# Patient Record
Sex: FEMALE | Race: WHITE | Employment: FULL TIME | URBAN - METROPOLITAN AREA
[De-identification: names, ages, dates, MRNs, and addresses within clinical notes are randomized per-mention and may not be internally consistent; named-entity substitution may affect disease eponyms.]

---

## 2017-01-04 ENCOUNTER — OFFICE VISIT (OUTPATIENT)
Dept: SURGERY | Age: 37
End: 2017-01-04

## 2017-01-04 VITALS — SYSTOLIC BLOOD PRESSURE: 124 MMHG | DIASTOLIC BLOOD PRESSURE: 84 MMHG

## 2017-01-04 DIAGNOSIS — L98.0 PYOGENIC GRANULOMA: Primary | ICD-10-CM

## 2017-01-04 PROCEDURE — 99212 OFFICE O/P EST SF 10 MIN: CPT | Performed by: SURGERY

## 2018-02-01 ENCOUNTER — HOSPITAL ENCOUNTER (OUTPATIENT)
Dept: ULTRASOUND IMAGING | Age: 38
Discharge: HOME | End: 2018-02-01
Attending: INTERNAL MEDICINE | Admitting: INTERNAL MEDICINE

## 2018-02-01 DIAGNOSIS — N63.0 BREAST MASS: ICD-10-CM

## 2018-02-01 DIAGNOSIS — N63.10 BREAST MASS, RIGHT: ICD-10-CM

## 2018-02-01 DIAGNOSIS — R10.11 RIGHT UPPER QUADRANT PAIN: ICD-10-CM

## 2018-02-01 DIAGNOSIS — R10.11 ABDOMINAL PAIN, RIGHT UPPER QUADRANT: ICD-10-CM

## 2018-02-12 ENCOUNTER — HOSPITAL ENCOUNTER (OUTPATIENT)
Dept: NUCLEAR MEDICINE | Age: 38
Discharge: HOME | End: 2018-02-12
Attending: INTERNAL MEDICINE | Admitting: INTERNAL MEDICINE

## 2018-02-12 DIAGNOSIS — R10.11 ABDOMINAL PAIN, RIGHT UPPER QUADRANT: ICD-10-CM

## 2018-02-12 RX ADMIN — Medication 6 MILLICURIE: at 11:37

## 2019-01-28 LAB
HCT VFR BLD CALC: 37.3 % (ref 36–48)
HEMOGLOBIN: 12.6 G/DL (ref 12–16)
MCH RBC QN AUTO: 27.4 PG (ref 26–34)
MCHC RBC AUTO-ENTMCNC: 33.7 G/DL (ref 31–36)
MCV RBC AUTO: 81.4 FL (ref 80–100)
PDW BLD-RTO: 13.4 % (ref 12.4–15.4)
PLATELET # BLD: 337 K/UL (ref 135–450)
PMV BLD AUTO: 7.8 FL (ref 5–10.5)
RBC # BLD: 4.59 M/UL (ref 4–5.2)
TSH SERPL DL<=0.05 MIU/L-ACNC: 2.95 UIU/ML (ref 0.27–4.2)
WBC # BLD: 7.4 K/UL (ref 4–11)

## 2019-01-30 LAB
HPV COMMENT: NORMAL
HPV TYPE 16: NOT DETECTED
HPV TYPE 18: NOT DETECTED
HPVOH (OTHER TYPES): NOT DETECTED

## 2020-09-21 ENCOUNTER — OFFICE VISIT (OUTPATIENT)
Dept: FAMILY MEDICINE CLINIC | Age: 40
End: 2020-09-21
Payer: COMMERCIAL

## 2020-09-21 VITALS
BODY MASS INDEX: 42.19 KG/M2 | TEMPERATURE: 96.8 F | DIASTOLIC BLOOD PRESSURE: 84 MMHG | SYSTOLIC BLOOD PRESSURE: 128 MMHG | OXYGEN SATURATION: 98 % | HEIGHT: 65 IN | WEIGHT: 253.2 LBS | HEART RATE: 88 BPM

## 2020-09-21 PROCEDURE — 99386 PREV VISIT NEW AGE 40-64: CPT | Performed by: NURSE PRACTITIONER

## 2020-09-21 ASSESSMENT — PATIENT HEALTH QUESTIONNAIRE - PHQ9
1. LITTLE INTEREST OR PLEASURE IN DOING THINGS: 0
SUM OF ALL RESPONSES TO PHQ QUESTIONS 1-9: 0
SUM OF ALL RESPONSES TO PHQ9 QUESTIONS 1 & 2: 0
2. FEELING DOWN, DEPRESSED OR HOPELESS: 0
SUM OF ALL RESPONSES TO PHQ QUESTIONS 1-9: 0

## 2020-09-21 ASSESSMENT — ENCOUNTER SYMPTOMS: BACK PAIN: 1

## 2020-10-01 ENCOUNTER — TELEPHONE (OUTPATIENT)
Dept: FAMILY MEDICINE CLINIC | Age: 40
End: 2020-10-01

## 2020-10-01 NOTE — TELEPHONE ENCOUNTER
Patient called wanting to speak with you about having anxiety and depression.   She wants to know if she needs to schedule a visit or would you be able to call in a prescription for her    Please give patient a call

## 2020-10-06 ENCOUNTER — HOSPITAL ENCOUNTER (OUTPATIENT)
Age: 40
Discharge: HOME | End: 2020-10-06
Payer: COMMERCIAL

## 2020-10-06 LAB
A/G RATIO: 1.9 (ref 1.1–2.2)
ALBUMIN SERPL-MCNC: 4.5 G/DL (ref 3.4–5)
ALP BLD-CCNC: 63 U/L (ref 40–129)
ALT SERPL-CCNC: 16 U/L (ref 10–40)
ANION GAP SERPL CALCULATED.3IONS-SCNC: 13 MMOL/L (ref 3–16)
AST SERPL-CCNC: 16 U/L (ref 15–37)
BASOPHILS ABSOLUTE: 0.1 K/UL (ref 0–0.2)
BASOPHILS RELATIVE PERCENT: 1.4 %
BILIRUB SERPL-MCNC: 0.9 MG/DL (ref 0–1)
BUN BLDV-MCNC: 10 MG/DL (ref 7–20)
CALCIUM SERPL-MCNC: 9.4 MG/DL (ref 8.3–10.6)
CHLORIDE BLD-SCNC: 99 MMOL/L (ref 99–110)
CHOLESTEROL, FASTING: 170 MG/DL (ref 0–199)
CO2: 25 MMOL/L (ref 21–32)
CREAT SERPL-MCNC: 0.8 MG/DL (ref 0.6–1.1)
EOSINOPHILS ABSOLUTE: 0.1 K/UL (ref 0–0.6)
EOSINOPHILS RELATIVE PERCENT: 1.9 %
GFR AFRICAN AMERICAN: >60
GFR NON-AFRICAN AMERICAN: >60
GLOBULIN: 2.4 G/DL
GLUCOSE FASTING: 87 MG/DL (ref 70–99)
HCT VFR BLD CALC: 37.9 % (ref 36–48)
HDLC SERPL-MCNC: 52 MG/DL (ref 40–60)
HEMOGLOBIN: 13.1 G/DL (ref 12–16)
LDL CHOLESTEROL CALCULATED: 90 MG/DL
LYMPHOCYTES ABSOLUTE: 2 K/UL (ref 1–5.1)
LYMPHOCYTES RELATIVE PERCENT: 27.7 %
MCH RBC QN AUTO: 28.8 PG (ref 26–34)
MCHC RBC AUTO-ENTMCNC: 34.5 G/DL (ref 31–36)
MCV RBC AUTO: 83.5 FL (ref 80–100)
MONOCYTES ABSOLUTE: 0.4 K/UL (ref 0–1.3)
MONOCYTES RELATIVE PERCENT: 6.1 %
NEUTROPHILS ABSOLUTE: 4.4 K/UL (ref 1.7–7.7)
NEUTROPHILS RELATIVE PERCENT: 62.9 %
PDW BLD-RTO: 13.4 % (ref 12.4–15.4)
PLATELET # BLD: 326 K/UL (ref 135–450)
PMV BLD AUTO: 7.6 FL (ref 5–10.5)
POTASSIUM SERPL-SCNC: 4.1 MMOL/L (ref 3.5–5.1)
RBC # BLD: 4.54 M/UL (ref 4–5.2)
SODIUM BLD-SCNC: 137 MMOL/L (ref 136–145)
TOTAL PROTEIN: 6.9 G/DL (ref 6.4–8.2)
TRIGLYCERIDE, FASTING: 141 MG/DL (ref 0–150)
TSH REFLEX: 1.66 UIU/ML (ref 0.27–4.2)
VLDLC SERPL CALC-MCNC: 28 MG/DL
WBC # BLD: 7.1 K/UL (ref 4–11)

## 2020-10-06 PROCEDURE — 36415 COLL VENOUS BLD VENIPUNCTURE: CPT

## 2020-10-06 PROCEDURE — 85025 COMPLETE CBC W/AUTO DIFF WBC: CPT

## 2020-10-06 PROCEDURE — 80061 LIPID PANEL: CPT

## 2020-10-06 PROCEDURE — 84443 ASSAY THYROID STIM HORMONE: CPT

## 2020-10-06 PROCEDURE — 80053 COMPREHEN METABOLIC PANEL: CPT

## 2020-10-08 ENCOUNTER — OFFICE VISIT (OUTPATIENT)
Dept: FAMILY MEDICINE CLINIC | Age: 40
End: 2020-10-08
Payer: COMMERCIAL

## 2020-10-08 VITALS
HEART RATE: 94 BPM | BODY MASS INDEX: 42.01 KG/M2 | TEMPERATURE: 96.6 F | WEIGHT: 254.4 LBS | SYSTOLIC BLOOD PRESSURE: 126 MMHG | DIASTOLIC BLOOD PRESSURE: 84 MMHG | OXYGEN SATURATION: 98 %

## 2020-10-08 PROCEDURE — 99213 OFFICE O/P EST LOW 20 MIN: CPT | Performed by: NURSE PRACTITIONER

## 2020-10-08 RX ORDER — BUPROPION HYDROCHLORIDE 300 MG/1
300 TABLET ORAL EVERY MORNING
Qty: 90 TABLET | Refills: 1 | Status: SHIPPED | OUTPATIENT
Start: 2020-10-08 | End: 2021-04-01

## 2020-10-08 ASSESSMENT — PATIENT HEALTH QUESTIONNAIRE - PHQ9
SUM OF ALL RESPONSES TO PHQ9 QUESTIONS 1 & 2: 2
SUM OF ALL RESPONSES TO PHQ QUESTIONS 1-9: 2
2. FEELING DOWN, DEPRESSED OR HOPELESS: 2
1. LITTLE INTEREST OR PLEASURE IN DOING THINGS: 0
SUM OF ALL RESPONSES TO PHQ QUESTIONS 1-9: 2

## 2020-10-08 ASSESSMENT — ENCOUNTER SYMPTOMS
CHEST TIGHTNESS: 1
NAUSEA: 0
CONSTIPATION: 0
SHORTNESS OF BREATH: 0
DIARRHEA: 0

## 2020-10-08 NOTE — PROGRESS NOTES
Joao Gilbert  : 1980  Encounter date: 10/8/2020    This adri 38 y.o. female who presents with  Chief Complaint   Patient presents with    Anxiety     X1 Month.  Depression     X1 month. Used to take Zoloft- has been off for about 3 years       History of present illness:    HPI Pt is 38year old female with worsening anxiety and depression. Reports history, started first time with birth of son 15 years ago, has been on and off zoloft intermittently. Pt reports recent worsened depression due to family health concerns, financial concerns. Pt tearful, reports good support system but feelings of overwhelmed. Pt not able to find time for self. Pt denies suicidal ideation or self harm. No current outpatient medications on file prior to visit. No current facility-administered medications on file prior to visit. No Known Allergies  Past Medical History:   Diagnosis Date    Back pain     Hypertension     mild      Past Surgical History:   Procedure Laterality Date    BACK SURGERY      KNEE SURGERY          WISDOM TOOTH EXTRACTION            Family History   Problem Relation Age of Onset    High Blood Pressure Mother     Atrial Fibrillation Father     COPD Father       Social History     Tobacco Use    Smoking status: Former Smoker    Smokeless tobacco: Never Used   Substance Use Topics    Alcohol use: Yes     Comment: rarely        Review of Systems   Constitutional: Positive for fatigue. Negative for activity change and appetite change. Respiratory: Positive for chest tightness. Negative for shortness of breath. Cardiovascular: Negative for chest pain. Gastrointestinal: Negative for constipation, diarrhea and nausea. Neurological: Negative for headaches. Psychiatric/Behavioral: Positive for dysphoric mood and sleep disturbance. Negative for decreased concentration, self-injury and suicidal ideas. The patient is nervous/anxious.         Objective:    /84 (Site: Left Upper Arm, Position: Sitting, Cuff Size: Large Adult)   Pulse 94   Temp 96.6 °F (35.9 °C)   Wt 254 lb 6.4 oz (115.4 kg)   SpO2 98%   BMI 42.01 kg/m²   Weight: 254 lb 6.4 oz (115.4 kg)     BP Readings from Last 3 Encounters:   10/08/20 126/84   09/21/20 128/84   01/04/17 124/84     Wt Readings from Last 3 Encounters:   10/08/20 254 lb 6.4 oz (115.4 kg)   09/21/20 253 lb 3.2 oz (114.9 kg)   12/21/16 256 lb (116.1 kg)     BMI Readings from Last 3 Encounters:   10/08/20 42.01 kg/m²   09/21/20 41.81 kg/m²   12/21/16 42.60 kg/m²       Physical Exam  Vitals signs reviewed. Constitutional:       Appearance: Normal appearance. She is well-developed. Cardiovascular:      Rate and Rhythm: Normal rate and regular rhythm. Heart sounds: Normal heart sounds. No murmur. Pulmonary:      Effort: Pulmonary effort is normal.      Breath sounds: Normal breath sounds. Skin:     General: Skin is warm and dry. Neurological:      Mental Status: She is alert and oriented to person, place, and time. Psychiatric:      Comments: Tearful when discussing concerns         Assessment/Plan    1. Moderate episode of recurrent major depressive disorder ([facility] [address]  Advised continued open communication  Practice self care  - buPROPion (WELLBUTRIN XL) 300 MG extended release tablet; Take 1 tablet by mouth every morning  Dispense: 90 tablet; Refill: 1      Return if symptoms worsen or fail to improve, for medication re-check. This dictation was generated by voice recognition computer software. Although all attempts are made to edit the dictation for accuracy, there may be errors in the transcription that are not intended.

## 2020-12-28 ENCOUNTER — OFFICE VISIT (OUTPATIENT)
Dept: PRIMARY CARE CLINIC | Age: 40
End: 2020-12-28
Payer: COMMERCIAL

## 2020-12-28 PROCEDURE — 99211 OFF/OP EST MAY X REQ PHY/QHP: CPT | Performed by: NURSE PRACTITIONER

## 2020-12-28 NOTE — PATIENT INSTRUCTIONS

## 2020-12-29 LAB — SARS-COV-2, NAA: NOT DETECTED

## 2021-02-04 ENCOUNTER — TELEPHONE (OUTPATIENT)
Dept: FAMILY MEDICINE CLINIC | Age: 41
End: 2021-02-04

## 2021-02-04 NOTE — TELEPHONE ENCOUNTER
Pt called and yes she is having symptoms Rt ear is full and her eye is puffy. Pt is coming in for an appt Monday. Per SW she should make an appt here with the red visit side or go to ED.

## 2021-02-08 ENCOUNTER — OFFICE VISIT (OUTPATIENT)
Dept: FAMILY MEDICINE CLINIC | Age: 41
End: 2021-02-08
Payer: COMMERCIAL

## 2021-02-08 VITALS — OXYGEN SATURATION: 99 % | TEMPERATURE: 97.9 F | HEART RATE: 87 BPM

## 2021-02-08 DIAGNOSIS — B96.89 ACUTE BACTERIAL SINUSITIS: Primary | ICD-10-CM

## 2021-02-08 DIAGNOSIS — J01.90 ACUTE BACTERIAL SINUSITIS: Primary | ICD-10-CM

## 2021-02-08 PROCEDURE — 99213 OFFICE O/P EST LOW 20 MIN: CPT | Performed by: FAMILY MEDICINE

## 2021-02-08 RX ORDER — AMOXICILLIN 500 MG/1
1000 CAPSULE ORAL 2 TIMES DAILY
Qty: 28 CAPSULE | Refills: 0 | Status: SHIPPED | OUTPATIENT
Start: 2021-02-08 | End: 2021-02-15

## 2021-02-08 ASSESSMENT — PATIENT HEALTH QUESTIONNAIRE - PHQ9
SUM OF ALL RESPONSES TO PHQ9 QUESTIONS 1 & 2: 0
SUM OF ALL RESPONSES TO PHQ QUESTIONS 1-9: 0
2. FEELING DOWN, DEPRESSED OR HOPELESS: 0
SUM OF ALL RESPONSES TO PHQ QUESTIONS 1-9: 0

## 2021-02-08 NOTE — PROGRESS NOTES
Subjective:      Patient ID: Jhony Carter is a 38 y.o. female. HPI patient presents with persistent nasal congestion which has been going on now since December and being treated with Claritin with some relief. Now she is fine her self having dizziness seems occur with position changes. There is been no vomiting associated with this. She denies any vision changes. There is no reported fevers or chills. Patient just had her second Covid vaccination last week    Review of Systems  No Known Allergies  Vitals:    02/08/21 1300   Pulse: 87   Temp: 97.9 °F (36.6 °C)   SpO2: 99%       Objective:   Physical Exam  Constitutional:       General: She is not in acute distress. Appearance: She is well-developed. HENT:      Right Ear: Tympanic membrane and ear canal normal.      Left Ear: Tympanic membrane and ear canal normal.      Nose: Mucosal edema and rhinorrhea (purulent) present. Right Sinus: No maxillary sinus tenderness or frontal sinus tenderness. Left Sinus: Maxillary sinus tenderness and frontal sinus tenderness present. Mouth/Throat:      Mouth: Mucous membranes are moist.      Pharynx: Oropharynx is clear. Neck:      Musculoskeletal: Neck supple. Vascular: No carotid bruit. Cardiovascular:      Rate and Rhythm: Normal rate and regular rhythm. Heart sounds: No murmur. Pulmonary:      Effort: Pulmonary effort is normal.      Breath sounds: Normal breath sounds. Lymphadenopathy:      Cervical: No cervical adenopathy. Neurological:      Mental Status: She is alert and oriented to person, place, and time. Cranial Nerves: Cranial nerves are intact. Sensory: Sensation is intact. Motor: Motor function is intact. Coordination: Romberg sign negative. Gait: Gait and tandem walk normal.   Psychiatric:         Behavior: Behavior is cooperative. Assessment:      José Manuel Cleary was seen today for headache and congestion.     Diagnoses and all orders for this visit:    Acute bacterial sinusitis    Other orders  -     amoxicillin (AMOXIL) 500 MG capsule; Take 2 capsules by mouth 2 times daily for 7 days            Plan:      Humidification of the bedroom was discussed.   Maintain over-the-counter medication when necessary  RTC when necessary    Medical decision making of low complexity            Douglas Wiggins MD

## 2021-04-01 DIAGNOSIS — F33.1 MODERATE EPISODE OF RECURRENT MAJOR DEPRESSIVE DISORDER (HCC): ICD-10-CM

## 2021-04-01 RX ORDER — BUPROPION HYDROCHLORIDE 300 MG/1
300 TABLET ORAL EVERY MORNING
Qty: 90 TABLET | Refills: 1 | Status: SHIPPED | OUTPATIENT
Start: 2021-04-01 | End: 2021-10-08 | Stop reason: SDUPTHER

## 2021-10-08 DIAGNOSIS — F33.1 MODERATE EPISODE OF RECURRENT MAJOR DEPRESSIVE DISORDER (HCC): ICD-10-CM

## 2021-10-08 RX ORDER — BUPROPION HYDROCHLORIDE 300 MG/1
300 TABLET ORAL EVERY MORNING
Qty: 90 TABLET | Refills: 0 | Status: SHIPPED | OUTPATIENT
Start: 2021-10-08 | End: 2022-01-05

## 2021-10-08 NOTE — TELEPHONE ENCOUNTER
----- Message from Kristin Cabrera sent at 10/8/2021  1:51 PM EDT -----  Subject: Message to Provider    QUESTIONS  Information for Provider? Patient is running out of meds. She only have a   2 day supply and don't Marichuy Wagner go without it. Can she get a prescription   called in for her meds. she asked that someone call back as soon as   possible. jonathan green  ---------------------------------------------------------------------------  --------------  CALL BACK INFO  What is the best way for the office to contact you? OK to leave message on   voicemail  Preferred Call Back Phone Number?  [phone]  ---------------------------------------------------------------------------  --------------  SCRIPT ANSWERS  undefined

## 2021-10-18 ENCOUNTER — OFFICE VISIT (OUTPATIENT)
Dept: FAMILY MEDICINE CLINIC | Age: 41
End: 2021-10-18
Payer: COMMERCIAL

## 2021-10-18 VITALS
OXYGEN SATURATION: 98 % | DIASTOLIC BLOOD PRESSURE: 92 MMHG | TEMPERATURE: 98.7 F | WEIGHT: 249 LBS | SYSTOLIC BLOOD PRESSURE: 128 MMHG | HEIGHT: 66 IN | HEART RATE: 90 BPM | BODY MASS INDEX: 40.02 KG/M2

## 2021-10-18 DIAGNOSIS — Z12.31 ENCOUNTER FOR SCREENING MAMMOGRAM FOR MALIGNANT NEOPLASM OF BREAST: ICD-10-CM

## 2021-10-18 DIAGNOSIS — Z00.00 ENCOUNTER FOR PREVENTATIVE ADULT HEALTH CARE EXAMINATION: Primary | ICD-10-CM

## 2021-10-18 DIAGNOSIS — F41.9 ANXIETY: ICD-10-CM

## 2021-10-18 DIAGNOSIS — Z23 NEED FOR VACCINATION: ICD-10-CM

## 2021-10-18 PROCEDURE — 90471 IMMUNIZATION ADMIN: CPT | Performed by: NURSE PRACTITIONER

## 2021-10-18 PROCEDURE — 99396 PREV VISIT EST AGE 40-64: CPT | Performed by: NURSE PRACTITIONER

## 2021-10-18 PROCEDURE — 90756 CCIIV4 VACC ABX FREE IM: CPT | Performed by: NURSE PRACTITIONER

## 2021-10-18 RX ORDER — LORATADINE 10 MG/1
10 TABLET ORAL DAILY
COMMUNITY

## 2021-10-18 RX ORDER — IBUPROFEN 600 MG/1
600 TABLET ORAL EVERY 6 HOURS PRN
COMMUNITY

## 2021-10-18 RX ORDER — BUSPIRONE HYDROCHLORIDE 5 MG/1
5 TABLET ORAL 2 TIMES DAILY
Qty: 60 TABLET | Refills: 0 | Status: SHIPPED | OUTPATIENT
Start: 2021-10-18 | End: 2022-04-04 | Stop reason: SDUPTHER

## 2021-10-18 NOTE — PROGRESS NOTES
Mario Bryant  : 1980  Encounter date: 10/18/2021    This adri 42 y.o. female who presents with  Chief Complaint   Patient presents with    Annual Exam     yearly check up/med check       History of present illness:    HPI   History and Physical      Mario Bryant  YOB: 1980    Date of Service:  10/18/2021    Chief Complaint:   Mario Bryant is a 42 y.o. female who  presents for physical examination. HPI: Pt is 42year old female for annual exam.  Pt is due for mammogram.  Due for labs. PT reports has lost some weight but feels she has not plateued. Pt is taking wellbutrin for anxiety/depression, reports on some days needs a little extra, feeling like she is \"crawling out of skin\". Wt Readings from Last 3 Encounters:   10/18/21 249 lb (112.9 kg)   10/08/20 254 lb 6.4 oz (115.4 kg)   20 253 lb 3.2 oz (114.9 kg)     BP Readings from Last 3 Encounters:   10/18/21 (!) 128/92   10/08/20 126/84   20 128/84       There is no problem list on file for this patient.       Preventive Care:  Health Maintenance   Topic Date Due    Hepatitis C screen  Never done    HIV screen  Never done    DTaP/Tdap/Td vaccine (1 - Tdap) Never done    Flu vaccine (1) 2021    Diabetes screen  10/06/2023    Cervical cancer screen  2024    Lipid screen  10/06/2025    COVID-19 Vaccine  Completed    Hepatitis A vaccine  Aged Out    Hepatitis B vaccine  Aged Out    Hib vaccine  Aged Out    Meningococcal (ACWY) vaccine  Aged Out    Pneumococcal 0-64 years Vaccine  Aged Out      Hx abnormal PAP: no, ablation  Sexual activity: single partner, contraception - none   Self-breast exams: yes  Previous DEXA scan: no  Last eye exam: , reports going up 2 on vision  Exercise: no regular exercise  Lipid panel:   Lab Results   Component Value Date    CHOL 182 2015    TRIG 121 2015    HDL 52 10/06/2020    [address] 10/06/2020        Living will:No    Immunization History   Administered Date(s) Administered    COVID-19, Moderna, PF, 100mcg/0.5mL 01/08/2021, 02/05/2021       No Known Allergies  Outpatient Medications Marked as Taking for the 10/18/21 encounter (Office Visit) with KELLI Glynn NP   Medication Sig Dispense Refill    loratadine (CLARITIN) 10 MG tablet Take 10 mg by mouth daily      ibuprofen (ADVIL;MOTRIN) 600 MG tablet Take 600 mg by mouth every 6 hours as needed for Pain      busPIRone (BUSPAR) 5 MG tablet Take 1 tablet by mouth 2 times daily 60 tablet 0    buPROPion (WELLBUTRIN XL) 300 MG extended release tablet Take 1 tablet by mouth every morning 90 tablet 0       Past Medical History:   Diagnosis Date    Back pain     Hypertension     mild     Past Surgical History:   Procedure Laterality Date    BACK SURGERY      KNEE SURGERY      1996    WISDOM TOOTH EXTRACTION      1997     Family History   Problem Relation Age of Onset    High Blood Pressure Mother     Atrial Fibrillation Father     COPD Father      Social History     Socioeconomic History    Marital status:      Spouse name: Not on file    Number of children: Not on file    Years of education: Not on file    Highest education level: Not on file   Occupational History    Not on file   Tobacco Use    Smoking status: Former Smoker    Smokeless tobacco: Never Used   Substance and Sexual Activity    Alcohol use: Yes     Comment: rarely    Drug use: No    Sexual activity: Not on file   Other Topics Concern    Not on file   Social History Narrative    Not on file     Social Determinants of Health     Financial Resource Strain:     Difficulty of Paying Living Expenses:    Food Insecurity:     Worried About Running Out of Food in the Last Year:     Ran Out of Food in the Last Year:    Transportation Needs:     Lack of Transportation (Medical):      Lack of Transportation (Non-Medical):    Physical Activity:     Days of Exercise per Week:     Minutes of Exercise per Session:    Stress:     Feeling of Stress :    Social Connections:     Frequency of Communication with Friends and Family:     Frequency of Social Gatherings with Friends and Family:     Attends [employer] Services:     Active Member of Clubs or Organizations:     Attends Club or Organization Meetings:     Marital Status:    Intimate Partner Violence:     Fear of Current or Ex-Partner:     Emotionally Abused:     Physically Abused:     Sexually Abused:        Review of Systems:  A comprehensive review of systems was negative except for what was noted in the HPI. Physical Exam:   Vitals:    10/18/21 1119 10/18/21 1137   BP: (!) 142/110 (!) 128/92   Site: Left Upper Arm    Position: Sitting    Cuff Size: Large Adult    Pulse: 90    Temp: 98.7 °F (37.1 °C)    SpO2: 98%    Weight: 249 lb (112.9 kg)    Height: 5' 5.5\" (1.664 m)      Body mass index is 40.81 kg/m². Constitutional: She is oriented to person, place, and time. She appears well-developed and well-nourished. No distress. HEENT:   Head: Normocephalic and atraumatic. Right Ear: Tympanic membrane, external ear and ear canal normal.   Left Ear: Tympanic membrane, external ear and ear canal normal.   Nose: Nose normal.   Mouth/Throat: Oropharynx is clear and moist, and mucous membranes are normal.  There is no cervical adenopathy. Eyes: Conjunctivae and extraocular motions are normal. Pupils are equal, round, and reactive to light. Neck: Neck supple. No JVD present. Carotid bruit is not present. No mass and no thyromegaly present. Cardiovascular: Normal rate, regular rhythm, normal heart sounds and intact distal pulses. Exam reveals no gallop and no friction rub. No murmur heard. Pulmonary/Chest: Effort normal and breath sounds normal. No respiratory distress. She has no wheezes, rhonchi or rales. Abdominal: Soft, non-tender. Bowel sounds and aorta are normal. She exhibits no organomegaly, mass or bruit.    Breast exam: not examined. Musculoskeletal: Normal range of motion, no synovitis. She exhibits no edema. Neurological: She is alert and oriented to person, place, and time. She has normal reflexes. No cranial nerve deficit. Coordination normal.   Skin: Skin is warm and dry. There is no rash or erythema. No suspicious lesions noted. Psychiatric: She has a normal mood and affect. Her speech is normal and behavior is normal. Judgment, cognition and memory are normal.     Assessment/Plan:    Nafisa Westfall was seen today for annual exam.    Diagnoses and all orders for this visit:    Encounter for preventative adult health care examination  -     CBC Auto Differential; Future  -     Comprehensive Metabolic Panel, Fasting; Future  -     Lipid, Fasting; Future    Encounter for screening mammogram for malignant neoplasm of breast  -     JAY DIGITAL SCREEN W OR WO CAD BILATERAL; Future    Anxiety  -     busPIRone (BUSPAR) 5 MG tablet; Take 1 tablet by mouth 2 times daily    Need for vaccination  -     INFLUENZA, MDCK QUADV, 2 YRS AND OLDER, IM, MDV, 0.5ML (FLUCELVAX QUADV)            Current Outpatient Medications on File Prior to Visit   Medication Sig Dispense Refill    loratadine (CLARITIN) 10 MG tablet Take 10 mg by mouth daily      ibuprofen (ADVIL;MOTRIN) 600 MG tablet Take 600 mg by mouth every 6 hours as needed for Pain      buPROPion (WELLBUTRIN XL) 300 MG extended release tablet Take 1 tablet by mouth every morning 90 tablet 0     No current facility-administered medications on file prior to visit.       No Known Allergies  Past Medical History:   Diagnosis Date    Back pain     Hypertension     mild      Past Surgical History:   Procedure Laterality Date    BACK SURGERY      KNEE SURGERY      1996    WISDOM TOOTH EXTRACTION      1997      Family History   Problem Relation Age of Onset    High Blood Pressure Mother     Atrial Fibrillation Father     COPD Father       Social History     Tobacco Use    Smoking status: Former Smoker    Smokeless tobacco: Never Used   Substance Use Topics    Alcohol use: Yes     Comment: rarely        Review of Systems    Objective:    BP (!) 128/92   Pulse 90   Temp 98.7 °F (37.1 °C)   Ht 5' 5.5\" (1.664 m)   Wt 249 lb (112.9 kg)   SpO2 98%   BMI 40.81 kg/m²   Weight: 249 lb (112.9 kg)     BP Readings from Last 3 Encounters:   10/18/21 (!) 128/92   10/08/20 126/84   09/21/20 128/84     Wt Readings from Last 3 Encounters:   10/18/21 249 lb (112.9 kg)   10/08/20 254 lb 6.4 oz (115.4 kg)   09/21/20 253 lb 3.2 oz (114.9 kg)     BMI Readings from Last 3 Encounters:   10/18/21 40.81 kg/m²   10/08/20 42.01 kg/m²   09/21/20 41.81 kg/m²       Physical Exam    Assessment/Plan    1. Encounter for preventative adult health care examination  Advised routine dental and vision  Advised living will  Increased exercise  - CBC Auto Differential; Future  - Comprehensive Metabolic Panel, Fasting; Future  - Lipid, Fasting; Future    2. Encounter for screening mammogram for malignant neoplasm of breast  - JAY DIGITAL SCREEN W OR WO CAD BILATERAL; Future    3. Anxiety  Uncontrolled  Continue wellbutrin 300 mg  - busPIRone (BUSPAR) 5 MG tablet; Take 1 tablet by mouth 2 times daily  Dispense: 60 tablet; Refill: 0    4. Need for vaccination  Administered  - INFLUENZA, MDCK QUADV, 2 YRS AND OLDER, IM, MDV, 0.5ML (Charolotte Foots)      Return in about 1 year (around 10/18/2022) for annual check up. This dictation was generated by voice recognition computer software. Although all attempts are made to edit the dictation for accuracy, there may be errors in the transcription that are not intended.

## 2021-10-25 ENCOUNTER — HOSPITAL ENCOUNTER (OUTPATIENT)
Age: 41
Discharge: HOME | End: 2021-10-25
Payer: COMMERCIAL

## 2021-10-25 DIAGNOSIS — Z00.00 ENCOUNTER FOR PREVENTATIVE ADULT HEALTH CARE EXAMINATION: ICD-10-CM

## 2021-10-25 LAB
A/G RATIO: 2 (ref 1.1–2.2)
ALBUMIN SERPL-MCNC: 4.8 G/DL (ref 3.4–5)
ALP BLD-CCNC: 57 U/L (ref 40–129)
ALT SERPL-CCNC: 14 U/L (ref 10–40)
ANION GAP SERPL CALCULATED.3IONS-SCNC: 14 MMOL/L (ref 3–16)
AST SERPL-CCNC: 13 U/L (ref 15–37)
BASOPHILS ABSOLUTE: 0.1 K/UL (ref 0–0.2)
BASOPHILS RELATIVE PERCENT: 1 %
BILIRUB SERPL-MCNC: 0.5 MG/DL (ref 0–1)
BUN BLDV-MCNC: 12 MG/DL (ref 7–20)
CALCIUM SERPL-MCNC: 9 MG/DL (ref 8.3–10.6)
CHLORIDE BLD-SCNC: 103 MMOL/L (ref 99–110)
CHOLESTEROL, FASTING: 183 MG/DL (ref 0–199)
CO2: 22 MMOL/L (ref 21–32)
CREAT SERPL-MCNC: 0.9 MG/DL (ref 0.6–1.1)
EOSINOPHILS ABSOLUTE: 0.1 K/UL (ref 0–0.6)
EOSINOPHILS RELATIVE PERCENT: 2.3 %
GFR AFRICAN AMERICAN: >60
GFR NON-AFRICAN AMERICAN: >60
GLOBULIN: 2.4 G/DL
GLUCOSE FASTING: 105 MG/DL (ref 70–99)
HCT VFR BLD CALC: 39.5 % (ref 36–48)
HDLC SERPL-MCNC: 51 MG/DL (ref 40–60)
HEMOGLOBIN: 13.2 G/DL (ref 12–16)
LDL CHOLESTEROL CALCULATED: 116 MG/DL
LYMPHOCYTES ABSOLUTE: 1.3 K/UL (ref 1–5.1)
LYMPHOCYTES RELATIVE PERCENT: 21.4 %
MCH RBC QN AUTO: 28.6 PG (ref 26–34)
MCHC RBC AUTO-ENTMCNC: 33.3 G/DL (ref 31–36)
MCV RBC AUTO: 85.9 FL (ref 80–100)
MONOCYTES ABSOLUTE: 0.4 K/UL (ref 0–1.3)
MONOCYTES RELATIVE PERCENT: 6 %
NEUTROPHILS ABSOLUTE: 4 K/UL (ref 1.7–7.7)
NEUTROPHILS RELATIVE PERCENT: 69.3 %
PDW BLD-RTO: 12.4 % (ref 12.4–15.4)
PLATELET # BLD: 297 K/UL (ref 135–450)
PMV BLD AUTO: 7.4 FL (ref 5–10.5)
POTASSIUM SERPL-SCNC: 4.1 MMOL/L (ref 3.5–5.1)
RBC # BLD: 4.6 M/UL (ref 4–5.2)
SODIUM BLD-SCNC: 139 MMOL/L (ref 136–145)
TOTAL PROTEIN: 7.2 G/DL (ref 6.4–8.2)
TRIGLYCERIDE, FASTING: 78 MG/DL (ref 0–150)
VLDLC SERPL CALC-MCNC: 16 MG/DL
WBC # BLD: 5.8 K/UL (ref 4–11)

## 2021-10-25 PROCEDURE — 80053 COMPREHEN METABOLIC PANEL: CPT

## 2021-10-25 PROCEDURE — 85025 COMPLETE CBC W/AUTO DIFF WBC: CPT

## 2021-10-25 PROCEDURE — 36415 COLL VENOUS BLD VENIPUNCTURE: CPT

## 2021-10-25 PROCEDURE — 80061 LIPID PANEL: CPT

## 2022-01-05 DIAGNOSIS — F33.1 MODERATE EPISODE OF RECURRENT MAJOR DEPRESSIVE DISORDER (HCC): ICD-10-CM

## 2022-01-05 RX ORDER — BUPROPION HYDROCHLORIDE 300 MG/1
300 TABLET ORAL EVERY MORNING
Qty: 90 TABLET | Refills: 0 | Status: SHIPPED | OUTPATIENT
Start: 2022-01-05 | End: 2022-03-31 | Stop reason: SDUPTHER

## 2022-01-05 NOTE — TELEPHONE ENCOUNTER
Medication:   Requested Prescriptions     Pending Prescriptions Disp Refills    buPROPion (WELLBUTRIN XL) 300 MG extended release tablet [Pharmacy Med Name: BUPROPION XL 300MG TABLETS] 90 tablet 0     Sig: TAKE 1 TABLET BY MOUTH EVERY MORNING     Last Filled:  10/8/21    Last appt: 10/18/2021   Next appt: Visit date not found    Last Lipid:   Lab Results   Component Value Date    CHOL 182 03/20/2015    TRIG 121 03/20/2015    HDL 51 10/25/2021    LDLCALC 116 10/25/2021

## 2022-01-13 ENCOUNTER — OFFICE VISIT (OUTPATIENT)
Dept: FAMILY MEDICINE CLINIC | Age: 42
End: 2022-01-13
Payer: COMMERCIAL

## 2022-01-13 VITALS
DIASTOLIC BLOOD PRESSURE: 98 MMHG | BODY MASS INDEX: 39.99 KG/M2 | TEMPERATURE: 98.2 F | SYSTOLIC BLOOD PRESSURE: 130 MMHG | OXYGEN SATURATION: 99 % | HEART RATE: 69 BPM | WEIGHT: 244 LBS

## 2022-01-13 DIAGNOSIS — B37.9 YEAST INFECTION: ICD-10-CM

## 2022-01-13 DIAGNOSIS — L72.3 SEBACEOUS CYST: Primary | ICD-10-CM

## 2022-01-13 PROCEDURE — 99213 OFFICE O/P EST LOW 20 MIN: CPT | Performed by: NURSE PRACTITIONER

## 2022-01-13 RX ORDER — FLUCONAZOLE 150 MG/1
150 TABLET ORAL ONCE
Qty: 1 TABLET | Refills: 0 | Status: SHIPPED | OUTPATIENT
Start: 2022-01-13 | End: 2022-01-13

## 2022-01-13 RX ORDER — DOXYCYCLINE HYCLATE 100 MG
100 TABLET ORAL 2 TIMES DAILY
Qty: 20 TABLET | Refills: 0 | Status: SHIPPED | OUTPATIENT
Start: 2022-01-13 | End: 2022-01-23

## 2022-01-13 NOTE — PROGRESS NOTES
Liana Kirby  : 1980  Encounter date: 2022    This adri 42 y.o. female who presents with  Chief Complaint   Patient presents with    Mass     on the back/big in size/hurts to touch       History of present illness:    HPI Pt is 42year old female with large skin lesion in center of back between shoulder blades, reports history of cysts, has never had to have surgical I&D. Pt reports going to [place] and applying sunscreen, developed small lump, first thought had drained significantly but has since become larger, firm and tender. Denies fevers. Has not tried anything so far. PT reports prone to yeast infections with ABX. Current Outpatient Medications on File Prior to Visit   Medication Sig Dispense Refill    buPROPion (WELLBUTRIN XL) 300 MG extended release tablet TAKE 1 TABLET BY MOUTH EVERY MORNING 90 tablet 0    loratadine (CLARITIN) 10 MG tablet Take 10 mg by mouth daily      ibuprofen (ADVIL;MOTRIN) 600 MG tablet Take 600 mg by mouth every 6 hours as needed for Pain       No current facility-administered medications on file prior to visit.       No Known Allergies  Past Medical History:   Diagnosis Date    Back pain     Hypertension     mild      Past Surgical History:   Procedure Laterality Date    BACK SURGERY      KNEE SURGERY          WISDOM TOOTH EXTRACTION            Family History   Problem Relation Age of Onset    High Blood Pressure Mother     Atrial Fibrillation Father     COPD Father       Social History     Tobacco Use    Smoking status: Former Smoker    Smokeless tobacco: Never Used   Substance Use Topics    Alcohol use: Yes     Comment: rarely        Review of Systems    Objective:    BP (!) 130/98 (Site: Left Upper Arm, Position: Sitting, Cuff Size: Large Adult)   Pulse 69   Temp 98.2 °F (36.8 °C)   Wt 244 lb (110.7 kg)   SpO2 99%   BMI 39.99 kg/m²   Weight: 244 lb (110.7 kg)     BP Readings from Last 3 Encounters:   22 (!) 130/98 10/18/21 (!) 128/92   10/08/20 126/84     Wt Readings from Last 3 Encounters:   01/13/22 244 lb (110.7 kg)   10/18/21 249 lb (112.9 kg)   10/08/20 254 lb 6.4 oz (115.4 kg)     BMI Readings from Last 3 Encounters:   01/13/22 39.99 kg/m²   10/18/21 40.81 kg/m²   10/08/20 42.01 kg/m²       Physical Exam  Vitals reviewed. Constitutional:       Appearance: Normal appearance. She is well-developed. Cardiovascular:      Rate and Rhythm: Normal rate and regular rhythm. Pulses: Normal pulses. Heart sounds: Normal heart sounds. No murmur heard. Pulmonary:      Effort: Pulmonary effort is normal.      Breath sounds: Normal breath sounds. Skin:     General: Skin is warm and dry. Findings: Erythema and lesion present. Comments: 1 inch circular firm cyst, minimal bruising, erythema, pustular head, tender   Neurological:      Mental Status: She is alert and oriented to person, place, and time. Assessment/Plan    1. Sebaceous cyst  Advised warm compress  OTC pain relievers  - doxycycline hyclate (VIBRA-TABS) 100 MG tablet; Take 1 tablet by mouth 2 times daily for 10 days  Dispense: 20 tablet; Refill: 0  - Susannah Trujillo MD, General Surgery, [facility]    2. Yeast infection  Advised probiotics  - fluconazole (DIFLUCAN) 150 MG tablet; Take 1 tablet by mouth once for 1 dose  Dispense: 1 tablet; Refill: 0      Return if symptoms worsen or fail to improve, for unresolved symptoms. This dictation was generated by voice recognition computer software. Although all attempts are made to edit the dictation for accuracy, there may be errors in the transcription that are not intended.

## 2022-01-14 ENCOUNTER — OFFICE VISIT (OUTPATIENT)
Dept: SURGERY | Age: 42
End: 2022-01-14
Payer: COMMERCIAL

## 2022-01-14 VITALS — DIASTOLIC BLOOD PRESSURE: 90 MMHG | BODY MASS INDEX: 39.99 KG/M2 | WEIGHT: 244 LBS | SYSTOLIC BLOOD PRESSURE: 124 MMHG

## 2022-01-14 DIAGNOSIS — L08.9 INFECTED SEBACEOUS CYST: Primary | ICD-10-CM

## 2022-01-14 DIAGNOSIS — L72.3 INFECTED SEBACEOUS CYST: Primary | ICD-10-CM

## 2022-01-14 PROCEDURE — 10060 I&D ABSCESS SIMPLE/SINGLE: CPT | Performed by: SURGERY

## 2022-01-14 ASSESSMENT — ENCOUNTER SYMPTOMS
GASTROINTESTINAL NEGATIVE: 1
EYES NEGATIVE: 1
RESPIRATORY NEGATIVE: 1
ALLERGIC/IMMUNOLOGIC NEGATIVE: 1

## 2022-01-14 NOTE — PROGRESS NOTES
:     Rafita Damon is a 42 y.o. female     CC: Infected sebaceous cyst of the upper back    HPI: 78-year-old female with an infected sebaceous cyst on the upper back      Past Medical History:   Diagnosis Date    Back pain     Hypertension     mild       Patient has no known allergies. Past Surgical History:   Procedure Laterality Date    BACK SURGERY      KNEE SURGERY      1996    WISDOM TOOTH EXTRACTION      1997        Prior to Visit Medications    Medication Sig Taking? Authorizing Provider   doxycycline hyclate (VIBRA-TABS) 100 MG tablet Take 1 tablet by mouth 2 times daily for 10 days Yes KELLI López NP   buPROPion (WELLBUTRIN XL) 300 MG extended release tablet TAKE 1 TABLET BY MOUTH EVERY MORNING Yes KELLI López NP   loratadine (CLARITIN) 10 MG tablet Take 10 mg by mouth daily Yes Historical Provider, MD   ibuprofen (ADVIL;MOTRIN) 600 MG tablet Take 600 mg by mouth every 6 hours as needed for Pain Yes Historical Provider, MD       Social History     Socioeconomic History    Marital status:      Spouse name: Not on file    Number of children: Not on file    Years of education: Not on file    Highest education level: Not on file   Occupational History    Not on file   Tobacco Use    Smoking status: Former Smoker    Smokeless tobacco: Never Used   Substance and Sexual Activity    Alcohol use: Yes     Comment: rarely    Drug use: No    Sexual activity: Not on file   Other Topics Concern    Not on file   Social History Narrative    Not on file     Social Determinants of Health     Financial Resource Strain:     Difficulty of Paying Living Expenses: Not on file   Food Insecurity:     Worried About [address] in the Last Year: Not on file    [address] in the Last Year: Not on file   Transportation Needs:     Lack of Transportation (Medical): Not on file    Lack of Transportation (Non-Medical):  Not on file   Physical Activity:     Days of Exercise per Week: Not on file    Minutes of Exercise per Session: Not on file   Stress:     Feeling of Stress : Not on file   Social Connections:     Frequency of Communication with Friends and Family: Not on file    Frequency of Social Gatherings with Friends and Family: Not on file    Attends Rastafari Services: Not on file    Active Member of Clubs or Organizations: Not on file    Attends Club or Organization Meetings: Not on file    Marital Status: Not on file   Intimate Partner Violence:     Fear of Current or Ex-Partner: Not on file    Emotionally Abused: Not on file    Physically Abused: Not on file    Sexually Abused: Not on file   Housing Stability:     Unable to Pay for Housing in the Last Year: Not on file    Number of [place] in the Last Year: Not on file    Unstable Housing in the Last Year: Not on file       Review of Systems   Constitutional: Negative. HENT: Negative. Eyes: Negative. Respiratory: Negative. Cardiovascular: Negative. Gastrointestinal: Negative. Endocrine: Negative. Genitourinary: Negative. Musculoskeletal: Negative. Skin: Negative. Allergic/Immunologic: Negative. Neurological: Negative. Hematological: Negative. Psychiatric/Behavioral: Negative.        :   Physical Exam  Wt 244 lb (110.7 kg)   BMI 39.99 kg/m²     :                                                                                                        Pre op Dx-infected sebaceous cyst of the upper back    Post op Dx-same    Procedure-incision and drainage of infected sebaceous cyst of the upper back    Surgeon-Dr. Malloy Bone    Anesthesia-local    EBL-min    Operative indications and consent-42year-old female with an infected sebaceous cyst of the upper back. The plan is for incision and drainage. Patient explained risks,benefits, complications and alternatives. Procedure-Patient prepped and draped in the usual sterile fashion.  Right lateral decubitus position. The skin and subcutaneous tissues were infiltrated with 1% lidocaine. A 15 mm cruciate incision was made over the maximum point of fluctuance in the upper back. Sebaceous debris as well as purulent fluid was expressed. Hemostasis was achieved with pressure. A dressing was then applied. The patient tolerated the procedure well.          Plan:      Dry bandage over site  May shower  Finish course of doxycycline  Follow-up in 2 weeks for excision of cyst wall and excision of another noninfected sebaceous cyst of the upper back

## 2022-01-14 NOTE — LETTER
[facility]  [address] [address]  Phone: [phone]  Fax: [phone]    January 14, 2022    Patient: Cari Reese  MRN:  [id number]  YOB: 1980  Date of Visit: 1/14/2022    Dear Johanna Minaya,    Thank you for the request for consultation for Deepali Escamilla. Below are the relevant portions of my assessment and plan of care. Assessment:   Pre op Dx-infected sebaceous cyst of the upper back    Post op Dx-same    Procedure-incision and drainage of infected sebaceous cyst of the upper back    Surgeon-Dr. Chaudhari Gain    Anesthesia-local    EBL-min    Operative indications and consent-42year-old female with an infected sebaceous cyst of the upper back. The plan is for incision and drainage. Patient explained risks,benefits, complications and alternatives. Procedure-Patient prepped and draped in the usual sterile fashion. Right lateral decubitus position. The skin and subcutaneous tissues were infiltrated with 1% lidocaine. A 15 mm cruciate incision was made over the maximum point of fluctuance in the upper back. Sebaceous debris as well as purulent fluid was expressed. Hemostasis was achieved with pressure. A dressing was then applied. The patient tolerated the procedure well. Plan:  Dry bandage over site  May shower  Finish course of doxycycline  Follow-up in 2 weeks for excision of cyst wall and excision of another noninfected sebaceous cyst of the upper back      If you have questions, please do not hesitate to call me. I look forward to following Adelaide Brooke along with you.     Sincerely,    Puneet Henderson MD    CC providers:    KELLI Smith - NURYS  [address] [place] [id number]  [address] [place]

## 2022-01-15 ENCOUNTER — PATIENT MESSAGE (OUTPATIENT)
Dept: FAMILY MEDICINE CLINIC | Age: 42
End: 2022-01-15

## 2022-01-15 DIAGNOSIS — B37.31 VAGINAL YEAST INFECTION: Primary | ICD-10-CM

## 2022-01-17 RX ORDER — FLUCONAZOLE 150 MG/1
150 TABLET ORAL ONCE
Qty: 1 TABLET | Refills: 1 | Status: SHIPPED | OUTPATIENT
Start: 2022-01-17 | End: 2022-01-17

## 2022-01-17 NOTE — TELEPHONE ENCOUNTER
From: Molly Toribio  To: Gallito Jose  Sent: 1/15/2022 10:45 AM EST  Subject: Doxycycline    Hello,    I have taken 4 of my Doxycycline pills and 1 Diflucan, but I still starting to feel the early stages of a yeast infection starting. Am I able to get another prescription for Diflucan? Please let me know.     Thanks,    Nelda Lee

## 2022-01-27 ENCOUNTER — PROCEDURE VISIT (OUTPATIENT)
Dept: SURGERY | Age: 42
End: 2022-01-27
Payer: COMMERCIAL

## 2022-01-27 VITALS — DIASTOLIC BLOOD PRESSURE: 84 MMHG | BODY MASS INDEX: 39.99 KG/M2 | SYSTOLIC BLOOD PRESSURE: 124 MMHG | WEIGHT: 244 LBS

## 2022-01-27 DIAGNOSIS — L08.9 INFECTED SEBACEOUS CYST: Primary | ICD-10-CM

## 2022-01-27 DIAGNOSIS — L72.3 INFECTED SEBACEOUS CYST: Primary | ICD-10-CM

## 2022-01-27 PROCEDURE — 12032 INTMD RPR S/A/T/EXT 2.6-7.5: CPT | Performed by: SURGERY

## 2022-01-27 PROCEDURE — 11403 EXC TR-EXT B9+MARG 2.1-3CM: CPT | Performed by: SURGERY

## 2022-01-27 PROCEDURE — 11404 EXC TR-EXT B9+MARG 3.1-4 CM: CPT | Performed by: SURGERY

## 2022-01-27 NOTE — PROGRESS NOTES
Office Procedure    Pre op Dx-tender sebaceous cyst of the left upper back infected sebaceous cyst of the left upper back    Post op Dx-same    Procedure-Excision of 2.2 cm sebaceous cyst of the left upper back with intermediate repair (3 cm), excision of infected sebaceous cyst of the left upper back (3.3 cm) with intermediate repair (4 cm)    Surgeon-Dr. Sarah Blood    Anesthesia-local    EBL-min    Operative indications and consent-42year-old female status post incision and drainage of an infected sebaceous cyst.  She also has a noninfected, tender sebaceous cyst of the left upper back. The plan is for excision of both sebaceous cysts. Patient explained risks,benefits, complications and alternatives. Procedure-Patient prepped and draped in the usual sterile fashion. Right lateral decubitus position. The skin and subcutaneous tissues were infiltrated with 1% lidocaine. A 22 mm sebaceous cyst of the left upper back was excised. The subcutaneous layer and superficial fascia was closed with 3-0 vicryl followed by 4-0 vicryl in the skin. Dermabond was applied. She was then reprepped and draped. The site of the infected sebaceous cyst was located more medially. The skin and subcutaneous tissue was infiltrated with 1% lidocaine. A 3.3 cm sebaceous cyst was excised. The superficial fascia and subcutaneous tissue was closed with interrupted 3-0 Vicryl sutures followed by running 4-0 subcuticular suture in the skin. Dermabond was applied the patient tolerated the procedure well.

## 2022-02-01 ENCOUNTER — OFFICE VISIT (OUTPATIENT)
Dept: FAMILY MEDICINE CLINIC | Age: 42
End: 2022-02-01
Payer: COMMERCIAL

## 2022-02-01 ENCOUNTER — TELEPHONE (OUTPATIENT)
Dept: FAMILY MEDICINE CLINIC | Age: 42
End: 2022-02-01

## 2022-02-01 ENCOUNTER — TELEPHONE (OUTPATIENT)
Dept: SURGERY | Age: 42
End: 2022-02-01

## 2022-02-01 VITALS — OXYGEN SATURATION: 98 % | HEART RATE: 88 BPM | TEMPERATURE: 99.3 F

## 2022-02-01 DIAGNOSIS — Z20.822 SUSPECTED COVID-19 VIRUS INFECTION: Primary | ICD-10-CM

## 2022-02-01 PROCEDURE — 99213 OFFICE O/P EST LOW 20 MIN: CPT | Performed by: NURSE PRACTITIONER

## 2022-02-01 NOTE — TELEPHONE ENCOUNTER
----- Message from Minoo Snider sent at 2/1/2022  9:18 AM EST -----  Subject: Appointment Request    Reason for Call: Semi-Routine Cough, Cold Symptoms    QUESTIONS  Type of Appointment? Established Patient  Reason for appointment request? No appointments available during search  Additional Information for Provider? Cough  Congestion  Sinus Pressure x   1day  ---------------------------------------------------------------------------  --------------  CALL BACK INFO  What is the best way for the office to contact you? OK to leave message on   voicemail  Preferred Call Back Phone Number? [phone]  ---------------------------------------------------------------------------  --------------  SCRIPT ANSWERS  Relationship to Patient? Self  Are you currently unable to finish sentences due to any difficulty   breathing? No  Are you unable to swallow liquids? No  Are you having fevers (100.4 or greater), chills, or sweats? No  Do you have COPD, asthma or a chronic lung condition? No  Have your symptoms been present for more than 5 days? No  Have you recently (14 days) been seen by a provider for this issue? No  Have you been diagnosed with, awaiting test results for, or told that you   are suspected of having COVID-19 (Coronavirus)? (If patient has tested   negative or was tested as a requirement for work, school, or travel and   not based on symptoms, answer no)? No  Within the past two weeks have you developed any of the following symptoms   (answer no if symptoms have been present longer than 2 weeks or began   more than 2 weeks ago)? Fever or Chills, Cough, Shortness of breath or   difficulty breathing, Loss of taste or smell, Sore throat, Nasal   congestion, Sneezing or runny nose, Fatigue or generalized body aches   (answer no if pain is specific to a body part e.g. back pain), Diarrhea,   Headache?  Yes

## 2022-02-01 NOTE — TELEPHONE ENCOUNTER
Results given, PT states incision healing fine    ----- Message from Yareli Conley MD sent at 1/31/2022  2:40 PM EST -----  Please call patient with pathology result  ----- Message -----  From: Aditya Donaldson Incoming Lab Results From [facility] ([facility])  Sent: 1/31/2022   1:48 PM EST  To: Yareli Conley MD

## 2022-02-01 NOTE — PROGRESS NOTES
2022  Saul Torres (:  1980)    Allergies: No Known Allergies        FLU/RESPIRATORY/COVID-19 CLINIC EVALUATION    HPI:   Chief Complaint   Patient presents with    Congestion    Cough    Fever    Headache        SYMPTOMS:  Pt is 42year old female with hoarseness, dry cough and ST started this morning. Reports fatigue. Pt has taken tylenol and decongestant. Home test negative this morning. INSTRUCTIONS:  \"[x]\" Indicates a positive item  \"[]\" Indicates a negative item        Symptom duration, days:    Date symptoms started : ____________    [x] 1   [] 2   [] 3   [] 4 - 7   [] 8 - 10   [] 11 - 13   [] >14    [] Fevers    [] Symptom (not measured)  [] Measured (Result:  degrees)  [] Chills  [x] Cough [x] Dry [] Productive   []Loss of Taste  [] Loss of Smell  []Decreased Appetite  [] Coughing up blood  }  [x] Chest Congestion  [x] Nasal Congestion  [] Runny  Nose  [] Sneezing  [] Feeling short of breath   []Sometimes    [] Frequently    [] All the time     [] Chest pain     [x] Headaches  [x]Tolerable  [] Severe     [x] Fatigue  [x] Sore throat  [] Muscle aches  [] Nausea  [] Vomiting  []Unable to keep fluids down     [] Diarrhea  [] Mild  []Severe       [x] Vaccinated for COVID 19  [] History of COVID 19 (Date:           )      [] OTHER SYMPTOMS:      Symptom course:   [] Worsening     [x] Stable     [] Improving      RISK FACTORS:1INSTRUCTIONS:  \"[x]\" Indicates a positive item. Negative  for risk factors if not checked.     [] Close contact with a lab confirmed COVID-19 patient within 14 days of symptom onset  [] History of travel from affected geographical areas within 14 days of symptom onset        PHYSICAL EXAMINATION:    Vitals:    22 1402   Pulse: 88   Temp: 99.3 °F (37.4 °C)   SpO2: 98%          [x] Alert  [x] Oriented to person/place/time    [x] No apparent distress   [] Toxic appearing  [] Face flushed appearing     [x] Normal Mood  [] Anxious appearing      [] Sclera clear    [] Pinna, TMs,  Canals normal bilaterally  [] TM Red  [] Right [] Left [] Bilateral  [] TM Bulging [] Right [] Left []  Billateral    [] Oropharynx [x] Clear [] Red [] Exudate [] Swollen    [x] No adenopathy [] Adenopathy __________    [x] Lungs clear with good movement and effort  [x] Breathing appears normal     [x] Speaks in complete sentences  [] Appears tachypneic   [] Wheezing           [] Rhonchi   [] Decreased    [x] CV RRR  [] No Murmur  [] Murmur  [] Irregular  [] Tachycardic    [] OTHER:  1}      TESTS ORDERED:    [] POCT FLU  [] POCT STREP  [x] COVID-19 Test sent  [] Appointment made at testing clinic for patient to get a COVID test.       TEST RESULTS:    POCT FLU test:  [] Positive  [] Negative  POCT STREP test:  [] Positive  [] Negative    ASSESSMENT:  [] Allergic Rhinitis  [] Asthma Exacerbation  [] Bronchitis  [] COPD Exacerbation  [] Gastroenteritis  [] Influenza  [] Sinusitis  [] Strep Throat [] Sore Throat  [] Viral URI   [x] Possible COVID-19   [] Exposure to COVID -19  [] Positive for COVID  [] Screening for Viral Disease (COVID test no sx)        Eitan Harden was seen today for congestion, cough, fever and headache.     Diagnoses and all orders for this visit:    Suspected COVID-19 virus infection  -     COVID-19    Other orders  -     COVID-19  -     COVID-19  -     COVID-19              [x] Low risk for complications from COVID 19  [] Moderate risk for complications from COVID 19  [] High risk for complications from COVID 19    PLAN:    [] Discharge home with written instructions for:  [] Flu management  [] Strep throat management  [] Viral respiratory illness management  [] Sinusitis management  [] Bronchitis Management  [x] Possible COVID-19 infection with self-quarantine and management of symptoms  [] Follow-up with primary care physician or emergency department if worsens  [x] Note given for work    [] Referred to emergency department for evaluation

## 2022-02-02 RX ORDER — AMOXICILLIN 875 MG/1
875 TABLET, COATED ORAL 2 TIMES DAILY
Qty: 20 TABLET | Refills: 0 | Status: SHIPPED | OUTPATIENT
Start: 2022-02-02 | End: 2022-02-12

## 2022-02-02 RX ORDER — GUAIFENESIN, PSEUDOEPHEDRINE HYDROCHLORIDE 600; 60 MG/1; MG/1
1 TABLET, EXTENDED RELEASE ORAL EVERY 12 HOURS
Qty: 20 TABLET | Refills: 0 | Status: SHIPPED | OUTPATIENT
Start: 2022-02-02 | End: 2022-02-12

## 2022-02-02 NOTE — TELEPHONE ENCOUNTER
PT was [facility] yesterday for [facility], Kenya De La Rosa told her that if she feels worse to let Her know. She now is complaining of her sinus pressure is getting worse and she has fever, and chills what can she take?  Please Advise?/

## 2022-02-03 LAB — SARS-COV-2: DETECTED

## 2022-03-31 ENCOUNTER — PATIENT MESSAGE (OUTPATIENT)
Dept: FAMILY MEDICINE CLINIC | Age: 42
End: 2022-03-31

## 2022-03-31 DIAGNOSIS — F41.9 ANXIETY: ICD-10-CM

## 2022-03-31 DIAGNOSIS — F33.1 MODERATE EPISODE OF RECURRENT MAJOR DEPRESSIVE DISORDER (HCC): ICD-10-CM

## 2022-03-31 RX ORDER — BUPROPION HYDROCHLORIDE 300 MG/1
300 TABLET ORAL EVERY MORNING
Qty: 90 TABLET | Refills: 0 | Status: SHIPPED | OUTPATIENT
Start: 2022-03-31 | End: 2022-06-27

## 2022-04-01 DIAGNOSIS — F41.9 ANXIETY: ICD-10-CM

## 2022-04-01 RX ORDER — BUSPIRONE HYDROCHLORIDE 5 MG/1
5 TABLET ORAL 2 TIMES DAILY
Qty: 60 TABLET | Refills: 0 | Status: CANCELLED | OUTPATIENT
Start: 2022-04-01 | End: 2022-05-01

## 2022-04-01 NOTE — TELEPHONE ENCOUNTER
From: Judy Shows  To: Omega Salguero  Sent: 3/31/2022 5:42 PM EDT  Subject: Medication refill    I need a refill on my Buspar 5 mg but it is not listed as 1 of my medications. Can you please refill it and add it to my medications list. You prescribed it on 10/18/21.   Thanks,  [facility]

## 2022-04-05 RX ORDER — BUSPIRONE HYDROCHLORIDE 5 MG/1
5 TABLET ORAL 2 TIMES DAILY
Qty: 60 TABLET | Refills: 1 | Status: SHIPPED | OUTPATIENT
Start: 2022-04-05 | End: 2022-06-01

## 2022-06-01 DIAGNOSIS — F41.9 ANXIETY: ICD-10-CM

## 2022-06-01 RX ORDER — BUSPIRONE HYDROCHLORIDE 5 MG/1
TABLET ORAL
Qty: 60 TABLET | Refills: 1 | Status: SHIPPED | OUTPATIENT
Start: 2022-06-01 | End: 2022-08-08

## 2022-06-26 DIAGNOSIS — F33.1 MODERATE EPISODE OF RECURRENT MAJOR DEPRESSIVE DISORDER (HCC): ICD-10-CM

## 2022-06-27 RX ORDER — BUPROPION HYDROCHLORIDE 300 MG/1
300 TABLET ORAL EVERY MORNING
Qty: 90 TABLET | Refills: 0 | Status: SHIPPED | OUTPATIENT
Start: 2022-06-27 | End: 2022-09-22

## 2022-08-05 DIAGNOSIS — F41.9 ANXIETY: ICD-10-CM

## 2022-08-08 RX ORDER — BUSPIRONE HYDROCHLORIDE 5 MG/1
TABLET ORAL
Qty: 60 TABLET | Refills: 1 | Status: SHIPPED | OUTPATIENT
Start: 2022-08-08 | End: 2022-10-05

## 2022-09-22 DIAGNOSIS — F33.1 MODERATE EPISODE OF RECURRENT MAJOR DEPRESSIVE DISORDER (HCC): ICD-10-CM

## 2022-09-22 RX ORDER — BUPROPION HYDROCHLORIDE 300 MG/1
300 TABLET ORAL EVERY MORNING
Qty: 90 TABLET | Refills: 0 | Status: SHIPPED | OUTPATIENT
Start: 2022-09-22

## 2022-10-04 DIAGNOSIS — F41.9 ANXIETY: ICD-10-CM

## 2022-10-05 RX ORDER — BUSPIRONE HYDROCHLORIDE 5 MG/1
TABLET ORAL
Qty: 60 TABLET | Refills: 0 | Status: SHIPPED | OUTPATIENT
Start: 2022-10-05 | End: 2022-10-31 | Stop reason: SDUPTHER

## 2022-10-31 ENCOUNTER — OFFICE VISIT (OUTPATIENT)
Dept: FAMILY MEDICINE CLINIC | Age: 42
End: 2022-10-31
Payer: COMMERCIAL

## 2022-10-31 VITALS
WEIGHT: 239 LBS | BODY MASS INDEX: 38.41 KG/M2 | SYSTOLIC BLOOD PRESSURE: 124 MMHG | DIASTOLIC BLOOD PRESSURE: 82 MMHG | OXYGEN SATURATION: 99 % | HEIGHT: 66 IN | TEMPERATURE: 98.1 F | HEART RATE: 101 BPM

## 2022-10-31 DIAGNOSIS — E66.09 CLASS 2 OBESITY DUE TO EXCESS CALORIES WITHOUT SERIOUS COMORBIDITY WITH BODY MASS INDEX (BMI) OF 39.0 TO 39.9 IN ADULT: ICD-10-CM

## 2022-10-31 DIAGNOSIS — E78.00 PURE HYPERCHOLESTEROLEMIA: ICD-10-CM

## 2022-10-31 DIAGNOSIS — Z00.00 ENCOUNTER FOR PREVENTATIVE ADULT HEALTH CARE EXAMINATION: Primary | ICD-10-CM

## 2022-10-31 DIAGNOSIS — Z23 FLU VACCINE NEED: ICD-10-CM

## 2022-10-31 DIAGNOSIS — Z12.31 ENCOUNTER FOR SCREENING MAMMOGRAM FOR MALIGNANT NEOPLASM OF BREAST: ICD-10-CM

## 2022-10-31 DIAGNOSIS — F41.9 ANXIETY: ICD-10-CM

## 2022-10-31 DIAGNOSIS — R73.9 HYPERGLYCEMIA: ICD-10-CM

## 2022-10-31 PROCEDURE — 90471 IMMUNIZATION ADMIN: CPT | Performed by: NURSE PRACTITIONER

## 2022-10-31 PROCEDURE — 99396 PREV VISIT EST AGE 40-64: CPT | Performed by: NURSE PRACTITIONER

## 2022-10-31 PROCEDURE — 90674 CCIIV4 VAC NO PRSV 0.5 ML IM: CPT | Performed by: NURSE PRACTITIONER

## 2022-10-31 RX ORDER — BUSPIRONE HYDROCHLORIDE 5 MG/1
TABLET ORAL
Qty: 60 TABLET | Refills: 5 | Status: SHIPPED | OUTPATIENT
Start: 2022-10-31

## 2022-10-31 SDOH — ECONOMIC STABILITY: FOOD INSECURITY: WITHIN THE PAST 12 MONTHS, THE FOOD YOU BOUGHT JUST DIDN'T LAST AND YOU DIDN'T HAVE MONEY TO GET MORE.: NEVER TRUE

## 2022-10-31 SDOH — ECONOMIC STABILITY: FOOD INSECURITY: WITHIN THE PAST 12 MONTHS, YOU WORRIED THAT YOUR FOOD WOULD RUN OUT BEFORE YOU GOT MONEY TO BUY MORE.: NEVER TRUE

## 2022-10-31 ASSESSMENT — PATIENT HEALTH QUESTIONNAIRE - PHQ9
SUM OF ALL RESPONSES TO PHQ QUESTIONS 1-9: 0
SUM OF ALL RESPONSES TO PHQ9 QUESTIONS 1 & 2: 0
SUM OF ALL RESPONSES TO PHQ QUESTIONS 1-9: 0
2. FEELING DOWN, DEPRESSED OR HOPELESS: 0
1. LITTLE INTEREST OR PLEASURE IN DOING THINGS: 0

## 2022-10-31 ASSESSMENT — SOCIAL DETERMINANTS OF HEALTH (SDOH): HOW HARD IS IT FOR YOU TO PAY FOR THE VERY BASICS LIKE FOOD, HOUSING, MEDICAL CARE, AND HEATING?: NOT HARD AT ALL

## 2022-10-31 NOTE — PROGRESS NOTES
Prasanna Hill  : 1980  Encounter date: 10/31/2022    This adri 36 y.o. female who presents with  Chief Complaint   Patient presents with    Annual Exam       History of present illness:    HPI History and Physical      Prasanna Hill  YOB: 1980    Date of Service:  10/31/2022    Chief Complaint:   Prasanna Hill is a 36 y.o. female who  presents for physical examination. HPI: Pt is 36year old female for preventative exam, due for labs. Reports concerns regarding weight loss. Reports trying low CHO and low caloric dieting, exercise, but feels she is no longer losing any weight. Interested in different options. Wt Readings from Last 3 Encounters:   10/31/22 239 lb (108.4 kg)   22 244 lb (110.7 kg)   22 244 lb (110.7 kg)     BP Readings from Last 3 Encounters:   10/31/22 124/82   22 124/84   22 (!) 124/90       There is no problem list on file for this patient.       Preventive Care:  Health Maintenance   Topic Date Due    HIV screen  Never done    Hepatitis C screen  Never done    DTaP/Tdap/Td vaccine (1 - Tdap) 2001    COVID-19 Vaccine (3 - Booster for Moderna series) 2021    Depression Screen  2022    Flu vaccine (1) 2022    Cervical cancer screen  2024    Diabetes screen  10/25/2024    Lipids  10/25/2026    Hepatitis A vaccine  Aged Out    Hib vaccine  Aged Out    Meningococcal (ACWY) vaccine  Aged Out    Pneumococcal 0-64 years Vaccine  Aged Out      Hx abnormal PAP: no  Sexual activity: single partner, contraception -  ablation    Self-breast exams: yes  Previous DEXA scan: no  Last eye exam: 10/2021  Exercise: PT assistant, work  Lipid panel:   Lab Results   Component Value Date    CHOL 182 2015    TRIG 121 2015    HDL 51 10/25/2021    [address] 116 (H) 10/25/2021        Living will:No    Immunization History   Administered Date(s) Administered    COVID-19, MODERNA BLUE border, Primary or Immunocompromised, (age 12y+), IM, 100 mcg/0.5mL 01/08/2021, 02/05/2021    Influenza, FLUBLOK, (age 25 y+), PF, 0.5mL 10/19/2020    Influenza, FLUCELVAX, (age 10 mo+), MDCK, MDV, 0.5mL 10/18/2021    PPD Test 01/05/2010    Td, unspecified formulation 01/01/2001       No Known Allergies  Outpatient Medications Marked as Taking for the 10/31/22 encounter (Office Visit) with Andreea Restrepo, APRN - NP   Medication Sig Dispense Refill    busPIRone (BUSPAR) 5 MG tablet TAKE 1 TABLET BY MOUTH TWICE DAILY 60 tablet 5    buPROPion (WELLBUTRIN XL) 300 MG extended release tablet TAKE 1 TABLET BY MOUTH EVERY MORNING 90 tablet 0    loratadine (CLARITIN) 10 MG tablet Take 10 mg by mouth daily      ibuprofen (ADVIL;MOTRIN) 600 MG tablet Take 600 mg by mouth every 6 hours as needed for Pain         Past Medical History:   Diagnosis Date    Back pain     Hypertension     mild     Past Surgical History:   Procedure Laterality Date    BACK SURGERY      KNEE SURGERY      1996    WISDOM TOOTH EXTRACTION      1997     Family History   Problem Relation Age of Onset    High Blood Pressure Mother     Atrial Fibrillation Father     COPD Father      Social History     Socioeconomic History    Marital status:      Spouse name: Not on file    Number of children: Not on file    Years of education: Not on file    Highest education level: Not on file   Occupational History    Not on file   Tobacco Use    Smoking status: Former    Smokeless tobacco: Never   Substance and Sexual Activity    Alcohol use: Yes     Comment: rarely    Drug use: No    Sexual activity: Not on file   Other Topics Concern    Not on file   Social History Narrative    Not on file     Social Determinants of Health     Financial Resource Strain: Low Risk     Difficulty of Paying Living Expenses: Not hard at all   Food Insecurity: No Food Insecurity    Worried About Running Out of Food in the Last Year: Never true    Ran Out of Food in the Last Year: Never true   Transportation Needs: Not on file   Physical Activity: Not on file   Stress: Not on file   Social Connections: Not on file   Intimate Partner Violence: Not on file   Housing Stability: Not on file       Review of Systems:  A comprehensive review of systems was negative. Physical Exam:   Vitals:    10/31/22 1032   BP: 124/82   Site: Right Upper Arm   Position: Sitting   Cuff Size: Large Adult   Pulse: (!) 101   Temp: 98.1 °F (36.7 °C)   TempSrc: Infrared   SpO2: 99%   Weight: 239 lb (108.4 kg)   Height: 5' 5.5\" (1.664 m)     Body mass index is 39.17 kg/m². Constitutional: She is oriented to person, place, and time. She appears well-developed and well-nourished. No distress. HEENT:   Head: Normocephalic and atraumatic. Right Ear: Tympanic membrane, external ear and ear canal normal.   Left Ear: Tympanic membrane, external ear and ear canal normal.   Nose: Nose normal.   Mouth/Throat: Oropharynx is clear and moist, and mucous membranes are normal.  There is no cervical adenopathy. Eyes: Conjunctivae and extraocular motions are normal. Pupils are equal, round, and reactive to light. Neck: Neck supple. No JVD present. Carotid bruit is not present. No mass and no thyromegaly present. Cardiovascular: Normal rate, regular rhythm, normal heart sounds and intact distal pulses. Exam reveals no gallop and no friction rub. No murmur heard. Pulmonary/Chest: Effort normal and breath sounds normal. No respiratory distress. She has no wheezes, rhonchi or rales. Abdominal: Soft, non-tender. Bowel sounds and aorta are normal. She exhibits no organomegaly, mass or bruit. Breast exam:  not examined. Musculoskeletal: Normal range of motion, no synovitis. She exhibits no edema. Neurological: She is alert and oriented to person, place, and time. She has normal reflexes. No cranial nerve deficit. Coordination normal.   Skin: Skin is warm and dry. There is no rash or erythema. No suspicious lesions noted.    Psychiatric: She has a normal mood and affect. Her speech is normal and behavior is normal. Judgment, cognition and memory are normal.     Assessment/Plan:    Ran Clayton was seen today for annual exam.    Diagnoses and all orders for this visit:    Encounter for preventative adult health care examination  -     CBC with Auto Differential; Future  -     Comprehensive Metabolic Panel, Fasting; Future  -     Hepatitis C Antibody; Future  -     HIV Screen; Future    Flu vaccine need  -     Influenza, FLUCELVAX, (age 10 mo+), IM, Preservative Free, 0.5 mL    Hyperglycemia  -     Hemoglobin A1C; Future    Pure hypercholesterolemia  -     Lipid, Fasting; Future    Anxiety  -     busPIRone (BUSPAR) 5 MG tablet; TAKE 1 TABLET BY MOUTH TWICE DAILY    Encounter for screening mammogram for malignant neoplasm of breast  -     [facility] DIGITAL SCREEN W OR WO CAD BILATERAL; Future    Class 2 obesity due to excess calories without serious comorbidity with body mass index (BMI) of 39.0 to 39.9 in [facility]        Current Outpatient Medications on File Prior to Visit   Medication Sig Dispense Refill    buPROPion (WELLBUTRIN XL) 300 MG extended release tablet TAKE 1 TABLET BY MOUTH EVERY MORNING 90 tablet 0    loratadine (CLARITIN) 10 MG tablet Take 10 mg by mouth daily      ibuprofen (ADVIL;MOTRIN) 600 MG tablet Take 600 mg by mouth every 6 hours as needed for Pain       No current facility-administered medications on file prior to visit.       No Known Allergies  Past Medical History:   Diagnosis Date    Back pain     Hypertension     mild      Past Surgical History:   Procedure Laterality Date    BACK SURGERY      KNEE SURGERY      1996    WISDOM TOOTH EXTRACTION      1997      Family History   Problem Relation Age of Onset    High Blood Pressure Mother     Atrial Fibrillation Father     COPD Father       Social History     Tobacco Use    Smoking status: Former    Smokeless tobacco: Never Substance Use Topics    Alcohol use: Yes     Comment: rarely        Review of Systems    Objective:    /82 (Site: Right Upper Arm, Position: Sitting, Cuff Size: Large Adult)   Pulse (!) 101   Temp 98.1 °F (36.7 °C) (Infrared)   Ht 5' 5.5\" (1.664 m)   Wt 239 lb (108.4 kg)   SpO2 99%   BMI 39.17 kg/m²   Weight: 239 lb (108.4 kg)     BP Readings from Last 3 Encounters:   10/31/22 124/82   01/27/22 124/84   01/14/22 (!) 124/90     Wt Readings from Last 3 Encounters:   10/31/22 239 lb (108.4 kg)   01/27/22 244 lb (110.7 kg)   01/14/22 244 lb (110.7 kg)     BMI Readings from Last 3 Encounters:   10/31/22 39.17 kg/m²   01/27/22 39.99 kg/m²   01/14/22 39.99 kg/m²       Physical Exam    Assessment/Plan    1. Encounter for preventative adult health care examination  Advised routine dental and vision  Increased exercise, healthy diet and weight loss  Advised living will  - CBC with Auto Differential; Future  - Comprehensive Metabolic Panel, Fasting; Future  - Hepatitis C Antibody; Future  - HIV Screen; Future    2. Flu vaccine need  Administered  - Influenza, FLUCELVAX, (age 10 mo+), IM, Preservative Free, 0.5 mL    3. Hyperglycemia  - Hemoglobin A1C; Future    4. Pure hypercholesterolemia  - Lipid, Fasting; Future    5. Anxiety  controlled  - busPIRone (BUSPAR) 5 MG tablet; TAKE 1 TABLET BY MOUTH TWICE DAILY  Dispense: 60 tablet; Refill: 5    6. Encounter for screening mammogram for malignant neoplasm of breast  - [facility] DIGITAL SCREEN W OR WO CAD BILATERAL; Future    7. Class 2 obesity due to excess calories without serious comorbidity with body mass index (BMI) of 39.0 to 39.9 in [facility] [employer]      Return in about 1 year (around 10/31/2023) for annual check up. This dictation was generated by voice recognition computer software.   Although all attempts are made to edit the dictation for accuracy, there may be errors in the transcription that are not intended.

## 2022-11-10 DIAGNOSIS — Z00.00 ENCOUNTER FOR PREVENTATIVE ADULT HEALTH CARE EXAMINATION: ICD-10-CM

## 2022-11-10 DIAGNOSIS — R73.9 HYPERGLYCEMIA: ICD-10-CM

## 2022-11-10 DIAGNOSIS — E78.00 PURE HYPERCHOLESTEROLEMIA: ICD-10-CM

## 2022-11-11 LAB
A/G RATIO: 2.6 (ref 1.1–2.2)
ALBUMIN SERPL-MCNC: 4.7 G/DL (ref 3.4–5)
ALP BLD-CCNC: 52 U/L (ref 40–129)
ALT SERPL-CCNC: 12 U/L (ref 10–40)
ANION GAP SERPL CALCULATED.3IONS-SCNC: 18 MMOL/L (ref 3–16)
AST SERPL-CCNC: 12 U/L (ref 15–37)
BASOPHILS ABSOLUTE: 0.1 K/UL (ref 0–0.2)
BASOPHILS RELATIVE PERCENT: 1.4 %
BILIRUB SERPL-MCNC: 0.4 MG/DL (ref 0–1)
BUN BLDV-MCNC: 8 MG/DL (ref 7–20)
CALCIUM SERPL-MCNC: 9.1 MG/DL (ref 8.3–10.6)
CHLORIDE BLD-SCNC: 103 MMOL/L (ref 99–110)
CHOLESTEROL, FASTING: 186 MG/DL (ref 0–199)
CO2: 19 MMOL/L (ref 21–32)
CREAT SERPL-MCNC: 0.9 MG/DL (ref 0.6–1.1)
EOSINOPHILS ABSOLUTE: 0.1 K/UL (ref 0–0.6)
EOSINOPHILS RELATIVE PERCENT: 1.4 %
ESTIMATED AVERAGE GLUCOSE: 96.8 MG/DL
GFR SERPL CREATININE-BSD FRML MDRD: >60 ML/MIN/{1.73_M2}
GLUCOSE FASTING: 100 MG/DL (ref 70–99)
HBA1C MFR BLD: 5 %
HCT VFR BLD CALC: 38.4 % (ref 36–48)
HDLC SERPL-MCNC: 57 MG/DL (ref 40–60)
HEMOGLOBIN: 12.8 G/DL (ref 12–16)
HEPATITIS C ANTIBODY INTERPRETATION: NORMAL
LDL CHOLESTEROL CALCULATED: 109 MG/DL
LYMPHOCYTES ABSOLUTE: 1.6 K/UL (ref 1–5.1)
LYMPHOCYTES RELATIVE PERCENT: 22.8 %
MCH RBC QN AUTO: 29.1 PG (ref 26–34)
MCHC RBC AUTO-ENTMCNC: 33.2 G/DL (ref 31–36)
MCV RBC AUTO: 87.6 FL (ref 80–100)
MONOCYTES ABSOLUTE: 0.5 K/UL (ref 0–1.3)
MONOCYTES RELATIVE PERCENT: 7.7 %
NEUTROPHILS ABSOLUTE: 4.6 K/UL (ref 1.7–7.7)
NEUTROPHILS RELATIVE PERCENT: 66.7 %
PDW BLD-RTO: 12.4 % (ref 12.4–15.4)
PLATELET # BLD: 308 K/UL (ref 135–450)
PMV BLD AUTO: 6.9 FL (ref 5–10.5)
POTASSIUM SERPL-SCNC: 4.2 MMOL/L (ref 3.5–5.1)
RBC # BLD: 4.38 M/UL (ref 4–5.2)
SODIUM BLD-SCNC: 140 MMOL/L (ref 136–145)
TOTAL PROTEIN: 6.5 G/DL (ref 6.4–8.2)
TRIGLYCERIDE, FASTING: 100 MG/DL (ref 0–150)
VLDLC SERPL CALC-MCNC: 20 MG/DL
WBC # BLD: 6.9 K/UL (ref 4–11)

## 2022-11-13 LAB — MISCELLANEOUS LAB TEST ORDER: NORMAL

## 2022-11-26 ENCOUNTER — PATIENT MESSAGE (OUTPATIENT)
Dept: FAMILY MEDICINE CLINIC | Age: 42
End: 2022-11-26

## 2022-11-28 NOTE — TELEPHONE ENCOUNTER
From: Shirlene Manrique  To: Hailey Shown  Sent: 11/26/2022 3:33 AM EST  Subject: Yeast infection     Hello. Can you send a prescription to my pharmacy for Diflucan? I feel like I am starting to get a yeast infection and would like to start on Diflucan before it starts. My pharmacy is [facility].     Thanks,  Blayne Cruz

## 2022-12-21 DIAGNOSIS — F33.1 MODERATE EPISODE OF RECURRENT MAJOR DEPRESSIVE DISORDER (HCC): ICD-10-CM

## 2022-12-22 RX ORDER — BUPROPION HYDROCHLORIDE 300 MG/1
300 TABLET ORAL EVERY MORNING
Qty: 90 TABLET | Refills: 0 | Status: SHIPPED | OUTPATIENT
Start: 2022-12-22

## 2023-03-26 DIAGNOSIS — F33.1 MODERATE EPISODE OF RECURRENT MAJOR DEPRESSIVE DISORDER (HCC): ICD-10-CM

## 2023-03-27 RX ORDER — BUPROPION HYDROCHLORIDE 300 MG/1
300 TABLET ORAL EVERY MORNING
Qty: 90 TABLET | Refills: 0 | Status: SHIPPED | OUTPATIENT
Start: 2023-03-27

## 2023-04-22 DIAGNOSIS — F41.9 ANXIETY: ICD-10-CM

## 2023-04-24 RX ORDER — BUSPIRONE HYDROCHLORIDE 5 MG/1
TABLET ORAL
Qty: 60 TABLET | Refills: 5 | Status: SHIPPED | OUTPATIENT
Start: 2023-04-24

## 2023-05-15 ENCOUNTER — OFFICE VISIT (OUTPATIENT)
Dept: FAMILY MEDICINE CLINIC | Age: 43
End: 2023-05-15
Payer: COMMERCIAL

## 2023-05-15 VITALS
BODY MASS INDEX: 39.49 KG/M2 | DIASTOLIC BLOOD PRESSURE: 86 MMHG | WEIGHT: 241 LBS | SYSTOLIC BLOOD PRESSURE: 130 MMHG | OXYGEN SATURATION: 98 % | TEMPERATURE: 97.9 F | HEART RATE: 92 BPM

## 2023-05-15 DIAGNOSIS — F41.9 ANXIETY AND DEPRESSION: Primary | ICD-10-CM

## 2023-05-15 DIAGNOSIS — F32.A ANXIETY AND DEPRESSION: Primary | ICD-10-CM

## 2023-05-15 PROCEDURE — 99213 OFFICE O/P EST LOW 20 MIN: CPT | Performed by: NURSE PRACTITIONER

## 2023-05-15 SDOH — ECONOMIC STABILITY: HOUSING INSECURITY
IN THE LAST 12 MONTHS, WAS THERE A TIME WHEN YOU DID NOT HAVE A STEADY PLACE TO SLEEP OR SLEPT IN A SHELTER (INCLUDING NOW)?: NO

## 2023-05-15 SDOH — ECONOMIC STABILITY: FOOD INSECURITY: WITHIN THE PAST 12 MONTHS, THE FOOD YOU BOUGHT JUST DIDN'T LAST AND YOU DIDN'T HAVE MONEY TO GET MORE.: NEVER TRUE

## 2023-05-15 SDOH — ECONOMIC STABILITY: FOOD INSECURITY: WITHIN THE PAST 12 MONTHS, YOU WORRIED THAT YOUR FOOD WOULD RUN OUT BEFORE YOU GOT MONEY TO BUY MORE.: NEVER TRUE

## 2023-05-15 SDOH — ECONOMIC STABILITY: INCOME INSECURITY: HOW HARD IS IT FOR YOU TO PAY FOR THE VERY BASICS LIKE FOOD, HOUSING, MEDICAL CARE, AND HEATING?: NOT HARD AT ALL

## 2023-05-15 ASSESSMENT — PATIENT HEALTH QUESTIONNAIRE - PHQ9
SUM OF ALL RESPONSES TO PHQ QUESTIONS 1-9: 12
6. FEELING BAD ABOUT YOURSELF - OR THAT YOU ARE A FAILURE OR HAVE LET YOURSELF OR YOUR FAMILY DOWN: 0
10. IF YOU CHECKED OFF ANY PROBLEMS, HOW DIFFICULT HAVE THESE PROBLEMS MADE IT FOR YOU TO DO YOUR WORK, TAKE CARE OF THINGS AT HOME, OR GET ALONG WITH OTHER PEOPLE: 2
2. FEELING DOWN, DEPRESSED OR HOPELESS: 2
SUM OF ALL RESPONSES TO PHQ9 QUESTIONS 1 & 2: 4
1. LITTLE INTEREST OR PLEASURE IN DOING THINGS: 2
9. THOUGHTS THAT YOU WOULD BE BETTER OFF DEAD, OR OF HURTING YOURSELF: 0
8. MOVING OR SPEAKING SO SLOWLY THAT OTHER PEOPLE COULD HAVE NOTICED. OR THE OPPOSITE, BEING SO FIGETY OR RESTLESS THAT YOU HAVE BEEN MOVING AROUND A LOT MORE THAN USUAL: 1
7. TROUBLE CONCENTRATING ON THINGS, SUCH AS READING THE NEWSPAPER OR WATCHING TELEVISION: 1
3. TROUBLE FALLING OR STAYING ASLEEP: 2
5. POOR APPETITE OR OVEREATING: 2
4. FEELING TIRED OR HAVING LITTLE ENERGY: 2

## 2023-05-15 NOTE — PROGRESS NOTES
Ramon Bustos  : 1980  Encounter date: 5/15/2023    This adri 36 y.o. female who presents with  Chief Complaint   Patient presents with    Anxiety     worsening    Depression     worsening       History of present illness:    HPI Pt is 36year old female for follow up on uncontrolled anxiety and depression. Pt currently taking buspar and wellbutrin, has taken zoloft in past.  Pt with multiple life stressors at this time, little self care. Discussed and advised gene site testing. Current Outpatient Medications on File Prior to Visit   Medication Sig Dispense Refill    busPIRone (BUSPAR) 5 MG tablet TAKE 1 TABLET BY MOUTH TWICE DAILY 60 tablet 5    buPROPion (WELLBUTRIN XL) 300 MG extended release tablet TAKE 1 TABLET BY MOUTH EVERY MORNING 90 tablet 0    loratadine (CLARITIN) 10 MG tablet Take 1 tablet by mouth daily      ibuprofen (ADVIL;MOTRIN) 600 MG tablet Take 1 tablet by mouth every 6 hours as needed for Pain       No current facility-administered medications on file prior to visit.       No Known Allergies  Past Medical History:   Diagnosis Date    Back pain     Hypertension     mild      Past Surgical History:   Procedure Laterality Date    BACK SURGERY      KNEE SURGERY          WISDOM TOOTH EXTRACTION            Family History   Problem Relation Age of Onset    High Blood Pressure Mother     Atrial Fibrillation Father     COPD Father       Social History     Tobacco Use    Smoking status: Former    Smokeless tobacco: Never   Substance Use Topics    Alcohol use: Yes     Comment: rarely        Review of Systems    Objective:    /86 (Site: Right Upper Arm, Position: Sitting, Cuff Size: Large Adult)   Pulse 92   Temp 97.9 °F (36.6 °C) (Infrared)   Wt 241 lb (109.3 kg)   SpO2 98%   BMI 39.49 kg/m²   Weight - Scale: 241 lb (109.3 kg)     BP Readings from Last 3 Encounters:   05/15/23 130/86   10/31/22 124/82   22 124/84     Wt Readings from Last 3 Encounters:   05/15/23 241

## 2023-05-23 ENCOUNTER — TELEPHONE (OUTPATIENT)
Dept: FAMILY MEDICINE CLINIC | Age: 43
End: 2023-05-23

## 2023-05-23 NOTE — TELEPHONE ENCOUNTER
Called Pt, no answer, LVM. Needs to schedule 15 min appt to go over gene sight results. Results scanned to chart.

## 2023-05-31 ENCOUNTER — OFFICE VISIT (OUTPATIENT)
Dept: FAMILY MEDICINE CLINIC | Age: 43
End: 2023-05-31
Payer: COMMERCIAL

## 2023-05-31 VITALS — WEIGHT: 241 LBS | OXYGEN SATURATION: 99 % | HEART RATE: 61 BPM | TEMPERATURE: 97.9 F | BODY MASS INDEX: 39.49 KG/M2

## 2023-05-31 DIAGNOSIS — F41.9 ANXIETY AND DEPRESSION: Primary | ICD-10-CM

## 2023-05-31 DIAGNOSIS — F32.A ANXIETY AND DEPRESSION: Primary | ICD-10-CM

## 2023-05-31 PROCEDURE — 99213 OFFICE O/P EST LOW 20 MIN: CPT | Performed by: NURSE PRACTITIONER

## 2023-05-31 RX ORDER — AMITRIPTYLINE HYDROCHLORIDE 10 MG/1
10 TABLET, FILM COATED ORAL NIGHTLY
Qty: 30 TABLET | Refills: 0 | Status: SHIPPED | OUTPATIENT
Start: 2023-05-31

## 2023-05-31 NOTE — PROGRESS NOTES
Silvana Ulrich  : 1980  Encounter date: 2023    This adri 36 y.o. female who presents with  Chief Complaint   Patient presents with    Other     Gene Sight Review       History of present illness:    HPI Pt is 36year old female for follow up on gene site results, previously taken wellbutrin, zoloft and buspar with little relief. Pt with overly stressful life, difficulty home/work balance. Pt reports quick to anger, little self care. Current Outpatient Medications on File Prior to Visit   Medication Sig Dispense Refill    busPIRone (BUSPAR) 5 MG tablet TAKE 1 TABLET BY MOUTH TWICE DAILY 60 tablet 5    buPROPion (WELLBUTRIN XL) 300 MG extended release tablet TAKE 1 TABLET BY MOUTH EVERY MORNING 90 tablet 0    loratadine (CLARITIN) 10 MG tablet Take 1 tablet by mouth daily      ibuprofen (ADVIL;MOTRIN) 600 MG tablet Take 1 tablet by mouth every 6 hours as needed for Pain       No current facility-administered medications on file prior to visit.       No Known Allergies  Past Medical History:   Diagnosis Date    Back pain     Hypertension     mild      Past Surgical History:   Procedure Laterality Date    BACK SURGERY      KNEE SURGERY          WISDOM TOOTH EXTRACTION            Family History   Problem Relation Age of Onset    High Blood Pressure Mother     Atrial Fibrillation Father     COPD Father       Social History     Tobacco Use    Smoking status: Former    Smokeless tobacco: Never   Substance Use Topics    Alcohol use: Yes     Comment: rarely        Review of Systems    Objective:    Pulse 61   Temp 97.9 °F (36.6 °C) (Infrared)   Wt 241 lb (109.3 kg)   SpO2 99%   BMI 39.49 kg/m²   Weight - Scale: 241 lb (109.3 kg)     BP Readings from Last 3 Encounters:   05/15/23 130/86   10/31/22 124/82   22 124/84     Wt Readings from Last 3 Encounters:   23 241 lb (109.3 kg)   05/15/23 241 lb (109.3 kg)   10/31/22 239 lb (108.4 kg)     BMI Readings from Last 3 Encounters:

## 2023-06-23 ENCOUNTER — TELEPHONE (OUTPATIENT)
Dept: FAMILY MEDICINE CLINIC | Age: 43
End: 2023-06-23

## 2023-06-23 DIAGNOSIS — F33.1 MODERATE EPISODE OF RECURRENT MAJOR DEPRESSIVE DISORDER (HCC): ICD-10-CM

## 2023-06-23 RX ORDER — BUPROPION HYDROCHLORIDE 300 MG/1
300 TABLET ORAL EVERY MORNING
Qty: 90 TABLET | Refills: 0 | OUTPATIENT
Start: 2023-06-23

## 2023-06-23 NOTE — TELEPHONE ENCOUNTER
Medication:   Requested Prescriptions     Pending Prescriptions Disp Refills    buPROPion (WELLBUTRIN XL) 300 MG extended release tablet [Pharmacy Med Name: BUPROPION XL 300MG TABLETS] 90 tablet 0     Sig: TAKE 1 TABLET BY MOUTH EVERY MORNING      Last Filled:      Patient Phone Number: [phone] (home)     Last appt: 5/31/2023   Next appt:     Last OARRS: No flowsheet data found.   PDMP Monitoring:    Last PDMP Brenda Unger as Reviewed [facility]):  Review User Review Instant Review Result   Kendal Alaniz 5/15/2023 10:12 AM Reviewed PDMP [1]     Preferred Pharmacy:   [facility] [address] [place] [address] [phone] Nikolai Tanner [phone] [address] [address]  Phone: [phone] Fax: [phone]

## 2023-06-28 ENCOUNTER — TELEPHONE (OUTPATIENT)
Dept: FAMILY MEDICINE CLINIC | Age: 43
End: 2023-06-28

## 2023-06-28 DIAGNOSIS — F32.A ANXIETY AND DEPRESSION: ICD-10-CM

## 2023-06-28 DIAGNOSIS — F41.9 ANXIETY AND DEPRESSION: ICD-10-CM

## 2023-06-28 RX ORDER — AMITRIPTYLINE HYDROCHLORIDE 10 MG/1
10 TABLET, FILM COATED ORAL NIGHTLY
Qty: 30 TABLET | Refills: 0 | Status: SHIPPED | OUTPATIENT
Start: 2023-06-28

## 2023-06-30 DIAGNOSIS — F41.9 ANXIETY AND DEPRESSION: ICD-10-CM

## 2023-06-30 DIAGNOSIS — F32.A ANXIETY AND DEPRESSION: ICD-10-CM

## 2023-06-30 RX ORDER — AMITRIPTYLINE HYDROCHLORIDE 10 MG/1
TABLET, FILM COATED ORAL
Qty: 30 TABLET | Refills: 0 | OUTPATIENT
Start: 2023-06-30

## 2023-07-17 ENCOUNTER — OFFICE VISIT (OUTPATIENT)
Dept: FAMILY MEDICINE CLINIC | Age: 43
End: 2023-07-17
Payer: COMMERCIAL

## 2023-07-17 VITALS
BODY MASS INDEX: 39.49 KG/M2 | SYSTOLIC BLOOD PRESSURE: 134 MMHG | OXYGEN SATURATION: 99 % | TEMPERATURE: 98.1 F | DIASTOLIC BLOOD PRESSURE: 86 MMHG | HEART RATE: 83 BPM | WEIGHT: 241 LBS

## 2023-07-17 DIAGNOSIS — F41.9 ANXIETY AND DEPRESSION: Primary | ICD-10-CM

## 2023-07-17 DIAGNOSIS — F32.A ANXIETY AND DEPRESSION: Primary | ICD-10-CM

## 2023-07-17 DIAGNOSIS — M67.449 GANGLION CYST OF JOINT OF FINGER: ICD-10-CM

## 2023-07-17 PROCEDURE — 99213 OFFICE O/P EST LOW 20 MIN: CPT | Performed by: NURSE PRACTITIONER

## 2023-07-17 RX ORDER — AMITRIPTYLINE HYDROCHLORIDE 10 MG/1
10 TABLET, FILM COATED ORAL NIGHTLY
Qty: 90 TABLET | Refills: 1 | Status: SHIPPED | OUTPATIENT
Start: 2023-07-17

## 2023-08-30 DIAGNOSIS — M67.449 GANGLION CYST OF JOINT OF FINGER: Primary | ICD-10-CM

## 2023-09-10 SDOH — HEALTH STABILITY: PHYSICAL HEALTH: ON AVERAGE, HOW MANY MINUTES DO YOU ENGAGE IN EXERCISE AT THIS LEVEL?: 10 MIN

## 2023-09-10 SDOH — HEALTH STABILITY: PHYSICAL HEALTH: ON AVERAGE, HOW MANY DAYS PER WEEK DO YOU ENGAGE IN MODERATE TO STRENUOUS EXERCISE (LIKE A BRISK WALK)?: 2 DAYS

## 2023-09-10 ASSESSMENT — SOCIAL DETERMINANTS OF HEALTH (SDOH)
WITHIN THE LAST YEAR, HAVE YOU BEEN HUMILIATED OR EMOTIONALLY ABUSED IN OTHER WAYS BY YOUR PARTNER OR EX-PARTNER?: NO
WITHIN THE LAST YEAR, HAVE YOU BEEN KICKED, HIT, SLAPPED, OR OTHERWISE PHYSICALLY HURT BY YOUR PARTNER OR EX-PARTNER?: NO
WITHIN THE LAST YEAR, HAVE YOU BEEN AFRAID OF YOUR PARTNER OR EX-PARTNER?: NO
WITHIN THE LAST YEAR, HAVE TO BEEN RAPED OR FORCED TO HAVE ANY KIND OF SEXUAL ACTIVITY BY YOUR PARTNER OR EX-PARTNER?: NO

## 2023-09-11 ENCOUNTER — OFFICE VISIT (OUTPATIENT)
Dept: ORTHOPEDIC SURGERY | Age: 43
End: 2023-09-11

## 2023-09-11 VITALS — BODY MASS INDEX: 40.15 KG/M2 | HEIGHT: 65 IN | WEIGHT: 241 LBS | RESPIRATION RATE: 16 BRPM

## 2023-09-11 DIAGNOSIS — M67.441 GANGLION, FINGER JOINT OF RIGHT HAND: Primary | ICD-10-CM

## 2023-09-11 NOTE — PROGRESS NOTES
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 It has been present for approximately 6 weeks. It's size is stable. It is not associated with pain. Feelings of stiffness and fullness are often perceived by the patient. There is not  a history of injury or overuse. There is no history of weakness, fever or chills. The patient has become concerned about the mass and the worsening associated symptoms and is seen for hand surgery evaluation. The pain assessment has been reviewed and is correct as stated. .    The patient's social history, past medical history, family history, medications, allergies and review of systems, entered 9/11/23, have been reviewed, and dated and are recorded in the chart. On physical examination the patient is Height: 5' 5\" (165.1 cm) tall and weighs Weight - Scale: 241 lb (109.3 kg). Respirations are 18 per minute. The patient is well nourished, is oriented to time and place, demonstrates appropriate mood and affect as well as normal gait and station. There is a 4mm mass present on the dorsal aspect of the right middle finger DIP joint. It is cystic and nontender to touch. It does transilluminate. The overlying skin is normal.  Range of motion of the fingers, thumbs and wrists is full, bilaterally with no pain. Distal circulation and sensation are intact in both upper extremities. Gross muscle strength is normal bilaterally. Deep tendon reflexes are present and symmetrical.  Hand and wrist joints are stable. There are no enlarged epitrochlear lymph nodes. I have personally reviewed and interpreted all previous external imaging studies, laboratory tests(CBC+diff, CMP, HbA1c, Lipids), diagnostic procedures and medical encounters pertinent to this patient's visit today. Impression: Ganglion cyst DIP joint right middle finger.     The

## 2023-09-21 DIAGNOSIS — R60.9 SWELLING: Primary | ICD-10-CM

## 2023-09-21 RX ORDER — HYDROCHLOROTHIAZIDE 12.5 MG/1
12.5 CAPSULE, GELATIN COATED ORAL DAILY PRN
Qty: 90 CAPSULE | Refills: 0 | Status: SHIPPED | OUTPATIENT
Start: 2023-09-21

## 2023-11-06 DIAGNOSIS — F41.9 ANXIETY: ICD-10-CM

## 2023-11-07 RX ORDER — BUSPIRONE HYDROCHLORIDE 5 MG/1
TABLET ORAL
Qty: 180 TABLET | Refills: 0 | Status: SHIPPED | OUTPATIENT
Start: 2023-11-07

## 2024-01-26 DIAGNOSIS — F41.9 ANXIETY AND DEPRESSION: ICD-10-CM

## 2024-01-26 DIAGNOSIS — F32.A ANXIETY AND DEPRESSION: ICD-10-CM

## 2024-01-26 RX ORDER — AMITRIPTYLINE HYDROCHLORIDE 10 MG/1
10 TABLET, FILM COATED ORAL NIGHTLY
Qty: 90 TABLET | Refills: 1 | OUTPATIENT
Start: 2024-01-26

## 2024-01-29 DIAGNOSIS — F32.A ANXIETY AND DEPRESSION: ICD-10-CM

## 2024-01-29 DIAGNOSIS — F41.9 ANXIETY AND DEPRESSION: ICD-10-CM

## 2024-01-29 RX ORDER — AMITRIPTYLINE HYDROCHLORIDE 10 MG/1
10 TABLET, FILM COATED ORAL NIGHTLY
Qty: 90 TABLET | Refills: 0 | Status: SHIPPED | OUTPATIENT
Start: 2024-01-29

## 2024-02-06 DIAGNOSIS — F41.9 ANXIETY: ICD-10-CM

## 2024-02-06 RX ORDER — BUSPIRONE HYDROCHLORIDE 5 MG/1
TABLET ORAL
Qty: 180 TABLET | Refills: 0 | Status: SHIPPED | OUTPATIENT
Start: 2024-02-06

## 2024-05-02 DIAGNOSIS — F41.9 ANXIETY AND DEPRESSION: ICD-10-CM

## 2024-05-02 DIAGNOSIS — F32.A ANXIETY AND DEPRESSION: ICD-10-CM

## 2024-05-02 RX ORDER — AMITRIPTYLINE HYDROCHLORIDE 10 MG/1
10 TABLET, FILM COATED ORAL NIGHTLY
Qty: 30 TABLET | Refills: 0 | Status: SHIPPED | OUTPATIENT
Start: 2024-05-02

## 2024-06-02 DIAGNOSIS — F32.A ANXIETY AND DEPRESSION: ICD-10-CM

## 2024-06-02 DIAGNOSIS — F41.9 ANXIETY AND DEPRESSION: ICD-10-CM

## 2024-06-03 RX ORDER — AMITRIPTYLINE HYDROCHLORIDE 10 MG/1
10 TABLET, FILM COATED ORAL NIGHTLY
Qty: 30 TABLET | Refills: 0 | OUTPATIENT
Start: 2024-06-03

## 2024-06-06 DIAGNOSIS — F32.A ANXIETY AND DEPRESSION: ICD-10-CM

## 2024-06-06 DIAGNOSIS — F41.9 ANXIETY AND DEPRESSION: ICD-10-CM

## 2024-06-06 RX ORDER — AMITRIPTYLINE HYDROCHLORIDE 10 MG/1
10 TABLET, FILM COATED ORAL NIGHTLY
Qty: 30 TABLET | Refills: 0 | Status: SHIPPED | OUTPATIENT
Start: 2024-06-06

## 2024-06-06 RX ORDER — AMITRIPTYLINE HYDROCHLORIDE 10 MG/1
10 TABLET, FILM COATED ORAL NIGHTLY
Qty: 30 TABLET | Refills: 0 | OUTPATIENT
Start: 2024-06-06

## 2024-06-14 ASSESSMENT — PATIENT HEALTH QUESTIONNAIRE - PHQ9
6. FEELING BAD ABOUT YOURSELF - OR THAT YOU ARE A FAILURE OR HAVE LET YOURSELF OR YOUR FAMILY DOWN: NOT AT ALL
7. TROUBLE CONCENTRATING ON THINGS, SUCH AS READING THE NEWSPAPER OR WATCHING TELEVISION: NOT AT ALL
3. TROUBLE FALLING OR STAYING ASLEEP: SEVERAL DAYS
8. MOVING OR SPEAKING SO SLOWLY THAT OTHER PEOPLE COULD HAVE NOTICED. OR THE OPPOSITE - BEING SO FIDGETY OR RESTLESS THAT YOU HAVE BEEN MOVING AROUND A LOT MORE THAN USUAL: NOT AT ALL
9. THOUGHTS THAT YOU WOULD BE BETTER OFF DEAD, OR OF HURTING YOURSELF: NOT AT ALL
4. FEELING TIRED OR HAVING LITTLE ENERGY: SEVERAL DAYS
SUM OF ALL RESPONSES TO PHQ QUESTIONS 1-9: 3
5. POOR APPETITE OR OVEREATING: NOT AT ALL
1. LITTLE INTEREST OR PLEASURE IN DOING THINGS: NOT AT ALL
SUM OF ALL RESPONSES TO PHQ QUESTIONS 1-9: 3
6. FEELING BAD ABOUT YOURSELF - OR THAT YOU ARE A FAILURE OR HAVE LET YOURSELF OR YOUR FAMILY DOWN: NOT AT ALL
10. IF YOU CHECKED OFF ANY PROBLEMS, HOW DIFFICULT HAVE THESE PROBLEMS MADE IT FOR YOU TO DO YOUR WORK, TAKE CARE OF THINGS AT HOME, OR GET ALONG WITH OTHER PEOPLE: NOT DIFFICULT AT ALL
9. THOUGHTS THAT YOU WOULD BE BETTER OFF DEAD, OR OF HURTING YOURSELF: NOT AT ALL
SUM OF ALL RESPONSES TO PHQ QUESTIONS 1-9: 3
2. FEELING DOWN, DEPRESSED OR HOPELESS: SEVERAL DAYS
1. LITTLE INTEREST OR PLEASURE IN DOING THINGS: NOT AT ALL
SUM OF ALL RESPONSES TO PHQ QUESTIONS 1-9: 3
4. FEELING TIRED OR HAVING LITTLE ENERGY: SEVERAL DAYS
3. TROUBLE FALLING OR STAYING ASLEEP: SEVERAL DAYS
10. IF YOU CHECKED OFF ANY PROBLEMS, HOW DIFFICULT HAVE THESE PROBLEMS MADE IT FOR YOU TO DO YOUR WORK, TAKE CARE OF THINGS AT HOME, OR GET ALONG WITH OTHER PEOPLE: NOT DIFFICULT AT ALL
SUM OF ALL RESPONSES TO PHQ QUESTIONS 1-9: 3
7. TROUBLE CONCENTRATING ON THINGS, SUCH AS READING THE NEWSPAPER OR WATCHING TELEVISION: NOT AT ALL
5. POOR APPETITE OR OVEREATING: NOT AT ALL
8. MOVING OR SPEAKING SO SLOWLY THAT OTHER PEOPLE COULD HAVE NOTICED. OR THE OPPOSITE, BEING SO FIGETY OR RESTLESS THAT YOU HAVE BEEN MOVING AROUND A LOT MORE THAN USUAL: NOT AT ALL
2. FEELING DOWN, DEPRESSED OR HOPELESS: SEVERAL DAYS
SUM OF ALL RESPONSES TO PHQ9 QUESTIONS 1 & 2: 1

## 2024-06-17 ENCOUNTER — OFFICE VISIT (OUTPATIENT)
Dept: FAMILY MEDICINE CLINIC | Age: 44
End: 2024-06-17
Payer: COMMERCIAL

## 2024-06-17 VITALS
BODY MASS INDEX: 40.77 KG/M2 | WEIGHT: 245 LBS | TEMPERATURE: 97.4 F | DIASTOLIC BLOOD PRESSURE: 78 MMHG | OXYGEN SATURATION: 98 % | HEART RATE: 101 BPM | SYSTOLIC BLOOD PRESSURE: 134 MMHG

## 2024-06-17 DIAGNOSIS — Z00.00 ENCOUNTER FOR PREVENTATIVE ADULT HEALTH CARE EXAMINATION: Primary | ICD-10-CM

## 2024-06-17 DIAGNOSIS — Z13.220 SCREENING FOR CHOLESTEROL LEVEL: ICD-10-CM

## 2024-06-17 DIAGNOSIS — Z12.11 SCREEN FOR COLON CANCER: ICD-10-CM

## 2024-06-17 DIAGNOSIS — Z00.00 ENCOUNTER FOR PREVENTATIVE ADULT HEALTH CARE EXAMINATION: ICD-10-CM

## 2024-06-17 DIAGNOSIS — E55.9 VITAMIN D DEFICIENCY: ICD-10-CM

## 2024-06-17 DIAGNOSIS — Z12.31 ENCOUNTER FOR SCREENING MAMMOGRAM FOR MALIGNANT NEOPLASM OF BREAST: ICD-10-CM

## 2024-06-17 LAB
ALBUMIN SERPL-MCNC: 4.5 G/DL (ref 3.4–5)
ALBUMIN/GLOB SERPL: 2 {RATIO} (ref 1.1–2.2)
ALP SERPL-CCNC: 64 U/L (ref 40–129)
ALT SERPL-CCNC: 16 U/L (ref 10–40)
ANION GAP SERPL CALCULATED.3IONS-SCNC: 9 MMOL/L (ref 3–16)
AST SERPL-CCNC: 15 U/L (ref 15–37)
BASOPHILS # BLD: 0.1 K/UL (ref 0–0.2)
BASOPHILS NFR BLD: 1.3 %
BILIRUB SERPL-MCNC: 0.6 MG/DL (ref 0–1)
BUN SERPL-MCNC: 10 MG/DL (ref 7–20)
CALCIUM SERPL-MCNC: 9.1 MG/DL (ref 8.3–10.6)
CHLORIDE SERPL-SCNC: 103 MMOL/L (ref 99–110)
CHOLEST SERPL-MCNC: 174 MG/DL (ref 0–199)
CO2 SERPL-SCNC: 26 MMOL/L (ref 21–32)
CREAT SERPL-MCNC: 0.8 MG/DL (ref 0.6–1.1)
DEPRECATED RDW RBC AUTO: 12.4 % (ref 12.4–15.4)
EOSINOPHIL # BLD: 0.2 K/UL (ref 0–0.6)
EOSINOPHIL NFR BLD: 2.8 %
GFR SERPLBLD CREATININE-BSD FMLA CKD-EPI: >90 ML/MIN/{1.73_M2}
GLUCOSE P FAST SERPL-MCNC: 101 MG/DL (ref 70–99)
HCT VFR BLD AUTO: 38.8 % (ref 36–48)
HDLC SERPL-MCNC: 55 MG/DL (ref 40–60)
HGB BLD-MCNC: 12.9 G/DL (ref 12–16)
LDL CHOLESTEROL: 97 MG/DL
LYMPHOCYTES # BLD: 1.3 K/UL (ref 1–5.1)
LYMPHOCYTES NFR BLD: 22.3 %
MCH RBC QN AUTO: 29.2 PG (ref 26–34)
MCHC RBC AUTO-ENTMCNC: 33.3 G/DL (ref 31–36)
MCV RBC AUTO: 87.5 FL (ref 80–100)
MONOCYTES # BLD: 0.4 K/UL (ref 0–1.3)
MONOCYTES NFR BLD: 6.5 %
NEUTROPHILS # BLD: 3.9 K/UL (ref 1.7–7.7)
NEUTROPHILS NFR BLD: 67.1 %
PLATELET # BLD AUTO: 331 K/UL (ref 135–450)
PMV BLD AUTO: 7.3 FL (ref 5–10.5)
POTASSIUM SERPL-SCNC: 4.3 MMOL/L (ref 3.5–5.1)
PROT SERPL-MCNC: 6.7 G/DL (ref 6.4–8.2)
RBC # BLD AUTO: 4.43 M/UL (ref 4–5.2)
SODIUM SERPL-SCNC: 138 MMOL/L (ref 136–145)
TRIGL SERPL-MCNC: 109 MG/DL (ref 0–150)
VLDLC SERPL CALC-MCNC: 22 MG/DL
WBC # BLD AUTO: 5.7 K/UL (ref 4–11)

## 2024-06-17 PROCEDURE — 99396 PREV VISIT EST AGE 40-64: CPT | Performed by: NURSE PRACTITIONER

## 2024-06-17 SDOH — ECONOMIC STABILITY: FOOD INSECURITY: WITHIN THE PAST 12 MONTHS, THE FOOD YOU BOUGHT JUST DIDN'T LAST AND YOU DIDN'T HAVE MONEY TO GET MORE.: NEVER TRUE

## 2024-06-17 SDOH — ECONOMIC STABILITY: INCOME INSECURITY: HOW HARD IS IT FOR YOU TO PAY FOR THE VERY BASICS LIKE FOOD, HOUSING, MEDICAL CARE, AND HEATING?: NOT HARD AT ALL

## 2024-06-17 SDOH — ECONOMIC STABILITY: FOOD INSECURITY: WITHIN THE PAST 12 MONTHS, YOU WORRIED THAT YOUR FOOD WOULD RUN OUT BEFORE YOU GOT MONEY TO BUY MORE.: NEVER TRUE

## 2024-06-17 NOTE — PROGRESS NOTES
external ear and ear canal normal.   Nose: Nose normal.   Mouth/Throat: Oropharynx is clear and moist, and mucous membranes are normal.  There is no cervical adenopathy.  Eyes: Conjunctivae and extraocular motions are normal. Pupils are equal, round, and reactive to light.   Neck: Neck supple. No JVD present. Carotid bruit is not present. No mass and no thyromegaly present.   Cardiovascular: Normal rate, regular rhythm, normal heart sounds and intact distal pulses.  Exam reveals no gallop and no friction rub.  No murmur heard.  Pulmonary/Chest: Effort normal and breath sounds normal. No respiratory distress. She has no wheezes, rhonchi or rales.   Abdominal: Soft, non-tender. Bowel sounds and aorta are normal. She exhibits no organomegaly, mass or bruit.   Breast exam:  not examined.  Musculoskeletal: Normal range of motion, no synovitis. She exhibits no edema.   Neurological: She is alert and oriented to person, place, and time. She has normal reflexes. No cranial nerve deficit. Coordination normal.   Skin: Skin is warm and dry. There is no rash or erythema.  No suspicious lesions noted.   Psychiatric: She has a normal mood and affect. Her speech is normal and behavior is normal. Judgment, cognition and memory are normal.     Assessment/Plan:    Minal was seen today for annual exam.    Diagnoses and all orders for this visit:    Encounter for preventative adult health care examination  -     CBC with Auto Differential; Future  -     Comprehensive Metabolic Panel, Fasting; Future    Screening for cholesterol level  -     Lipid, Fasting; Future    Vitamin D deficiency  -     Vitamin D 25 Hydroxy; Future    Screen for colon cancer  -     Elver Clarke MD, Gastroenterology, [facility]    Encounter for screening mammogram for malignant neoplasm of breast  -     [facility] DIGITAL SCREEN W OR WO CAD BILATERAL; Future            Current Outpatient Medications on File Prior to Visit   Medication Sig Dispense

## 2024-06-18 DIAGNOSIS — E55.9 VITAMIN D DEFICIENCY: Primary | ICD-10-CM

## 2024-06-18 LAB — 25(OH)D3 SERPL-MCNC: 14.8 NG/ML

## 2024-06-18 RX ORDER — ERGOCALCIFEROL 1.25 MG/1
50000 CAPSULE ORAL WEEKLY
Qty: 12 CAPSULE | Refills: 1 | Status: SHIPPED | OUTPATIENT
Start: 2024-06-18

## 2024-07-05 DIAGNOSIS — F32.A ANXIETY AND DEPRESSION: ICD-10-CM

## 2024-07-05 DIAGNOSIS — F41.9 ANXIETY AND DEPRESSION: ICD-10-CM

## 2024-07-08 RX ORDER — AMITRIPTYLINE HYDROCHLORIDE 10 MG/1
10 TABLET, FILM COATED ORAL NIGHTLY
Qty: 30 TABLET | Refills: 0 | Status: SHIPPED | OUTPATIENT
Start: 2024-07-08

## 2024-07-18 ENCOUNTER — PATIENT MESSAGE (OUTPATIENT)
Dept: FAMILY MEDICINE CLINIC | Age: 44
End: 2024-07-18

## 2024-07-22 NOTE — TELEPHONE ENCOUNTER
Called pt, set up appointments for son and daughter. Pt had no further needs or questions at this time.

## 2024-08-04 DIAGNOSIS — F41.9 ANXIETY AND DEPRESSION: ICD-10-CM

## 2024-08-04 DIAGNOSIS — F32.A ANXIETY AND DEPRESSION: ICD-10-CM

## 2024-08-05 RX ORDER — AMITRIPTYLINE HYDROCHLORIDE 10 MG/1
10 TABLET, FILM COATED ORAL NIGHTLY
Qty: 90 TABLET | Refills: 0 | Status: SHIPPED | OUTPATIENT
Start: 2024-08-05

## 2024-10-31 DIAGNOSIS — F41.9 ANXIETY AND DEPRESSION: ICD-10-CM

## 2024-10-31 DIAGNOSIS — F32.A ANXIETY AND DEPRESSION: ICD-10-CM

## 2024-10-31 RX ORDER — AMITRIPTYLINE HYDROCHLORIDE 10 MG/1
10 TABLET ORAL NIGHTLY
Qty: 90 TABLET | Refills: 0 | Status: SHIPPED | OUTPATIENT
Start: 2024-10-31

## 2024-11-02 DIAGNOSIS — F32.A ANXIETY AND DEPRESSION: ICD-10-CM

## 2024-11-02 DIAGNOSIS — F41.9 ANXIETY AND DEPRESSION: ICD-10-CM

## 2024-11-04 RX ORDER — AMITRIPTYLINE HYDROCHLORIDE 10 MG/1
10 TABLET ORAL NIGHTLY
Qty: 90 TABLET | Refills: 0 | OUTPATIENT
Start: 2024-11-04

## 2024-11-07 ENCOUNTER — HOSPITAL ENCOUNTER (OUTPATIENT)
Age: 44
Discharge: HOME | End: 2024-11-07
Payer: COMMERCIAL

## 2024-11-07 ENCOUNTER — HOSPITAL ENCOUNTER (OUTPATIENT)
Dept: GENERAL RADIOLOGY | Age: 44
Discharge: HOME | End: 2024-11-07
Payer: COMMERCIAL

## 2024-11-07 ENCOUNTER — OFFICE VISIT (OUTPATIENT)
Dept: FAMILY MEDICINE CLINIC | Age: 44
End: 2024-11-07
Payer: COMMERCIAL

## 2024-11-07 VITALS — TEMPERATURE: 97.8 F | OXYGEN SATURATION: 99 % | HEART RATE: 96 BPM

## 2024-11-07 DIAGNOSIS — M77.8 TENDONITIS OF WRIST, RIGHT: ICD-10-CM

## 2024-11-07 DIAGNOSIS — M25.531 ACUTE PAIN OF RIGHT WRIST: Primary | ICD-10-CM

## 2024-11-07 DIAGNOSIS — M25.531 ACUTE PAIN OF RIGHT WRIST: ICD-10-CM

## 2024-11-07 PROCEDURE — 73110 X-RAY EXAM OF WRIST: CPT

## 2024-11-07 PROCEDURE — 99213 OFFICE O/P EST LOW 20 MIN: CPT | Performed by: NURSE PRACTITIONER

## 2024-11-07 RX ORDER — OMEPRAZOLE 40 MG/1
40 CAPSULE, DELAYED RELEASE ORAL DAILY
COMMUNITY
Start: 2024-10-14

## 2024-11-07 RX ORDER — CALCIUM CITRATE 1040MG
TABLET ORAL
COMMUNITY

## 2024-11-07 RX ORDER — AMOXICILLIN 250 MG
CAPSULE ORAL
COMMUNITY

## 2024-11-07 RX ORDER — M-VIT,TX,IRON,MINS/CALC/FOLIC 27MG-0.4MG
1 TABLET ORAL DAILY
COMMUNITY

## 2024-11-07 RX ORDER — METHYLPREDNISOLONE 4 MG/1
TABLET ORAL
Qty: 1 KIT | Refills: 0 | Status: SHIPPED | OUTPATIENT
Start: 2024-11-07 | End: 2024-11-13

## 2024-11-07 RX ORDER — BIOTIN 1000 MCG
TABLET,CHEWABLE ORAL
COMMUNITY

## 2024-11-07 NOTE — PROGRESS NOTES
Minal Bustamante  : 1980  Encounter date: 2024    This adri 44 y.o. female who presents with  Chief Complaint   Patient presents with    Wrist Pain     Swelling at joint x4days  Did a lot of heavy lifting over the weekend, could have hit it on something  OTC ice       History of present illness:    HPI Pt is 44 year old female with concerns for R wrist pain started 4 days ago when moving furniture.  Denies known trauma, reports repetition.  Reports swelling lateral side, no bruising or redness noted.  Pt reports poor ROM with pain.  Denies loss of , tingling or radiation up arm.  Pt has applied ice and tylenol with little relief.    Current Outpatient Medications on File Prior to Visit   Medication Sig Dispense Refill    omeprazole (PRILOSEC) 40 MG delayed release capsule Take 1 capsule by mouth daily      Biotin 1000 MCG CHEW Take by mouth      Cobalamin Combinations (B-12) 100-5000 MCG SUBL Place under the tongue      Multiple Vitamins-Minerals (THERAPEUTIC MULTIVITAMIN-MINERALS) tablet Take 1 tablet by mouth daily      Calcium Citrate 1040 MG TABS Take by mouth      amitriptyline (ELAVIL) 10 MG tablet TAKE 1 TABLET BY MOUTH EVERY NIGHT 90 tablet 0    vitamin D (ERGOCALCIFEROL) 1.25 MG (09555 UT) CAPS capsule Take 1 capsule by mouth once a week 12 capsule 1    busPIRone (BUSPAR) 5 MG tablet TAKE 1 TABLET BY MOUTH TWICE DAILY 180 tablet 0    hydroCHLOROthiazide (MICROZIDE) 12.5 MG capsule TAKE 1 CAPSULE BY MOUTH DAILY AS NEEDED 90 capsule 0    loratadine (CLARITIN) 10 MG tablet Take 1 tablet by mouth daily      ibuprofen (ADVIL;MOTRIN) 600 MG tablet Take 1 tablet by mouth every 6 hours as needed for Pain       No current facility-administered medications on file prior to visit.      Allergies   Allergen Reactions    Dilaudid [Hydromorphone] Itching     Past Medical History:   Diagnosis Date    Back pain     Hypertension     mild      Past Surgical History:   Procedure Laterality Date    BACK

## 2024-12-07 DIAGNOSIS — E55.9 VITAMIN D DEFICIENCY: ICD-10-CM

## 2024-12-09 RX ORDER — ERGOCALCIFEROL 1.25 MG/1
50000 CAPSULE, LIQUID FILLED ORAL WEEKLY
Qty: 12 CAPSULE | Refills: 1 | Status: SHIPPED | OUTPATIENT
Start: 2024-12-09

## 2025-01-09 ENCOUNTER — OFFICE VISIT (OUTPATIENT)
Dept: FAMILY MEDICINE CLINIC | Age: 45
End: 2025-01-09

## 2025-01-09 VITALS — OXYGEN SATURATION: 99 % | TEMPERATURE: 97.2 F | HEART RATE: 92 BPM

## 2025-01-09 DIAGNOSIS — R05.9 COUGH, UNSPECIFIED TYPE: Primary | ICD-10-CM

## 2025-01-09 DIAGNOSIS — R09.89 CHEST CONGESTION: ICD-10-CM

## 2025-01-09 DIAGNOSIS — J02.9 SORE THROAT: ICD-10-CM

## 2025-01-09 DIAGNOSIS — J06.9 VIRAL URI: ICD-10-CM

## 2025-01-09 LAB
INFLUENZA A ANTIGEN, POC: NEGATIVE
INFLUENZA B ANTIGEN, POC: NEGATIVE
LOT EXPIRE DATE: NORMAL
LOT KIT NUMBER: NORMAL
SARS-COV-2, POC: NORMAL
VALID INTERNAL CONTROL: NORMAL
VENDOR AND KIT NAME POC: NORMAL

## 2025-01-09 RX ORDER — HYDROCODONE POLISTIREX AND CHLORPHENIRAMINE POLISTIREX 10; 8 MG/5ML; MG/5ML
5 SUSPENSION, EXTENDED RELEASE ORAL EVERY 12 HOURS PRN
Qty: 70 ML | Refills: 0 | Status: SHIPPED | OUTPATIENT
Start: 2025-01-09 | End: 2025-01-16

## 2025-01-09 RX ORDER — METHYLPREDNISOLONE 4 MG/1
TABLET ORAL
Qty: 1 KIT | Refills: 0 | Status: SHIPPED | OUTPATIENT
Start: 2025-01-09 | End: 2025-01-15

## 2025-01-09 NOTE — PROGRESS NOTES
2025  Minal GERARDO Bustamante (:  1980)    Allergies:   Allergies   Allergen Reactions    Dilaudid [Hydromorphone] Itching         FLU/RESPIRATORY/COVID-19 CLINIC EVALUATION    HPI:   Chief Complaint   Patient presents with    Cough        SYMPTOMS:    INSTRUCTIONS:  \"[x]\" Indicates a positive item  \"[]\" Indicates a negative item        Symptom duration, days:    Date symptoms started : ____________    [] 1   [x] 2   [] 3   [] 4 - 7   [] 8 - 10   [] 11 - 13   [] >14    [] Fevers    [] Symptom (not measured)  [] Measured (Result:  degrees)  [] Chills  [x] Cough [] Dry [x] Productive   []Loss of Taste  [] Loss of Smell  []Decreased Appetite  [] Coughing up blood  }  [] Chest Congestion  [x] Nasal Congestion  [] Runny  Nose  [] Sneezing  [] Feeling short of breath   []Sometimes    [] Frequently    [] All the time     [] Chest pain     [x] Headaches  []Tolerable  [] Severe     [] Fatigue  [x] Sore throat  [x] Muscle aches  [] Nausea  [] Vomiting  []Unable to keep fluids down     [] Diarrhea  [] Mild  []Severe       [] Vaccinated for COVID 19  [] History of COVID 19 (Date:           )      [] OTHER SYMPTOMS: Sinus pressure      Symptom course:   [] Worsening     [x] Stable     [] Improving      RISK FACTORS:1INSTRUCTIONS:  \"[x]\" Indicates a positive item.   Negative  for risk factors if not checked.    [] Close contact with a lab confirmed COVID-19 patient within 14 days of symptom onset  [] History of travel from affected geographical areas within 14 days of symptom onset        PHYSICAL EXAMINATION:    Vitals:    25 1328   Pulse: 92   Temp: 97.2 °F (36.2 °C)   TempSrc: Temporal   SpO2: 99%          [x] Alert  [x] Oriented to person/place/time    [x] No apparent distress   [] Toxic appearing  [] Face flushed appearing     [x] Normal Mood  [] Anxious appearing      [x] Sclera clear    [x] Pinna, TMs,  Canals normal bilaterally  [] TM Red  [] Right [] Left [] Bilateral  [] TM Bulging [] Right [] Left []   Female

## 2025-02-03 DIAGNOSIS — F41.9 ANXIETY AND DEPRESSION: ICD-10-CM

## 2025-02-03 DIAGNOSIS — F32.A ANXIETY AND DEPRESSION: ICD-10-CM

## 2025-02-03 RX ORDER — AMITRIPTYLINE HYDROCHLORIDE 10 MG/1
10 TABLET ORAL NIGHTLY
Qty: 90 TABLET | Refills: 0 | Status: SHIPPED | OUTPATIENT
Start: 2025-02-03

## 2025-03-08 DIAGNOSIS — E55.9 VITAMIN D DEFICIENCY: ICD-10-CM

## 2025-03-10 RX ORDER — ERGOCALCIFEROL 1.25 MG/1
50000 CAPSULE, LIQUID FILLED ORAL WEEKLY
Qty: 12 CAPSULE | Refills: 1 | Status: SHIPPED | OUTPATIENT
Start: 2025-03-10

## 2025-04-21 DIAGNOSIS — F32.A ANXIETY AND DEPRESSION: ICD-10-CM

## 2025-04-21 DIAGNOSIS — F41.9 ANXIETY AND DEPRESSION: ICD-10-CM

## 2025-04-21 RX ORDER — AMITRIPTYLINE HYDROCHLORIDE 10 MG/1
10 TABLET ORAL NIGHTLY
Qty: 90 TABLET | Refills: 0 | Status: SHIPPED | OUTPATIENT
Start: 2025-04-21

## 2025-04-21 NOTE — TELEPHONE ENCOUNTER
Medication:   Requested Prescriptions     Pending Prescriptions Disp Refills    amitriptyline (ELAVIL) 10 MG tablet [Pharmacy Med Name: AMITRIPTYLINE 10MG TABLETS] 90 tablet 0     Sig: TAKE 1 TABLET BY MOUTH EVERY NIGHT          Patient Phone Number: [phone] (home)     Last appt: 1/9/2025   Next appt: Visit date not found    Last OARRS:        No data to display              PDMP Monitoring:    Last PDMP Raghav as Reviewed ([place]):  Review User Review Instant Review Result   ROMULO SIFUENTES 5/15/2023 10:12 AM Reviewed PDMP [1]     [facility] Pharmacy:   [id number] [place] - [address] [facility] [phone] F [phone]  [address] [place] [address]  Phone: [phone] Fax: [phone]

## 2025-06-16 DIAGNOSIS — E55.9 VITAMIN D DEFICIENCY: ICD-10-CM

## 2025-06-16 RX ORDER — ERGOCALCIFEROL 1.25 MG/1
50000 CAPSULE, LIQUID FILLED ORAL WEEKLY
Qty: 12 CAPSULE | Refills: 1 | Status: SHIPPED | OUTPATIENT
Start: 2025-06-16

## 2025-07-18 DIAGNOSIS — F41.9 ANXIETY AND DEPRESSION: ICD-10-CM

## 2025-07-18 DIAGNOSIS — F32.A ANXIETY AND DEPRESSION: ICD-10-CM

## 2025-07-18 RX ORDER — AMITRIPTYLINE HYDROCHLORIDE 10 MG/1
10 TABLET ORAL NIGHTLY
Qty: 90 TABLET | Refills: 0 | Status: SHIPPED | OUTPATIENT
Start: 2025-07-18

## 2025-07-26 DIAGNOSIS — F32.A ANXIETY AND DEPRESSION: ICD-10-CM

## 2025-07-26 DIAGNOSIS — F41.9 ANXIETY AND DEPRESSION: ICD-10-CM

## 2025-07-28 RX ORDER — AMITRIPTYLINE HYDROCHLORIDE 10 MG/1
10 TABLET ORAL NIGHTLY
Qty: 90 TABLET | Refills: 0 | OUTPATIENT
Start: 2025-07-28

## 2025-07-28 NOTE — TELEPHONE ENCOUNTER
Medication:   Requested Prescriptions     Pending Prescriptions Disp Refills    amitriptyline (ELAVIL) 10 MG tablet [Pharmacy Med Name: AMITRIPTYLINE 10MG TABLETS] 90 tablet 0     Sig: TAKE 1 TABLET BY MOUTH EVERY NIGHT      Last Filled:      Patient Phone Number: [phone] (home)     Last appt: 1/9/2025   Next appt: Visit date not found    Last OARRS:        No data to display              PDMP Monitoring:    Last PDMP Raghav as Reviewed ([place]):  Review User Review Instant Review Result   ROMULO SIFUENTES 5/15/2023 10:12 AM Reviewed PDMP [1]     Preferred Pharmacy:   [id number] - [facility], [place] - [address] [phone] - F [phone]  [address] [address]  Phone: [phone]: [phone]

## 2025-08-22 ASSESSMENT — PATIENT HEALTH QUESTIONNAIRE - PHQ9
SUM OF ALL RESPONSES TO PHQ QUESTIONS 1-9: 4
8. MOVING OR SPEAKING SO SLOWLY THAT OTHER PEOPLE COULD HAVE NOTICED. OR THE OPPOSITE - BEING SO FIDGETY OR RESTLESS THAT YOU HAVE BEEN MOVING AROUND A LOT MORE THAN USUAL: NOT AT ALL
5. POOR APPETITE OR OVEREATING: SEVERAL DAYS
1. LITTLE INTEREST OR PLEASURE IN DOING THINGS: NOT AT ALL
SUM OF ALL RESPONSES TO PHQ QUESTIONS 1-9: 4
10. IF YOU CHECKED OFF ANY PROBLEMS, HOW DIFFICULT HAVE THESE PROBLEMS MADE IT FOR YOU TO DO YOUR WORK, TAKE CARE OF THINGS AT HOME, OR GET ALONG WITH OTHER PEOPLE: SOMEWHAT DIFFICULT
7. TROUBLE CONCENTRATING ON THINGS, SUCH AS READING THE NEWSPAPER OR WATCHING TELEVISION: NOT AT ALL
7. TROUBLE CONCENTRATING ON THINGS, SUCH AS READING THE NEWSPAPER OR WATCHING TELEVISION: NOT AT ALL
SUM OF ALL RESPONSES TO PHQ QUESTIONS 1-9: 4
2. FEELING DOWN, DEPRESSED OR HOPELESS: SEVERAL DAYS
5. POOR APPETITE OR OVEREATING: SEVERAL DAYS
3. TROUBLE FALLING OR STAYING ASLEEP: SEVERAL DAYS
SUM OF ALL RESPONSES TO PHQ QUESTIONS 1-9: 4
6. FEELING BAD ABOUT YOURSELF - OR THAT YOU ARE A FAILURE OR HAVE LET YOURSELF OR YOUR FAMILY DOWN: NOT AT ALL
4. FEELING TIRED OR HAVING LITTLE ENERGY: SEVERAL DAYS
2. FEELING DOWN, DEPRESSED OR HOPELESS: SEVERAL DAYS
1. LITTLE INTEREST OR PLEASURE IN DOING THINGS: NOT AT ALL
3. TROUBLE FALLING OR STAYING ASLEEP: SEVERAL DAYS
SUM OF ALL RESPONSES TO PHQ QUESTIONS 1-9: 4
9. THOUGHTS THAT YOU WOULD BE BETTER OFF DEAD, OR OF HURTING YOURSELF: NOT AT ALL
8. MOVING OR SPEAKING SO SLOWLY THAT OTHER PEOPLE COULD HAVE NOTICED. OR THE OPPOSITE, BEING SO FIGETY OR RESTLESS THAT YOU HAVE BEEN MOVING AROUND A LOT MORE THAN USUAL: NOT AT ALL
10. IF YOU CHECKED OFF ANY PROBLEMS, HOW DIFFICULT HAVE THESE PROBLEMS MADE IT FOR YOU TO DO YOUR WORK, TAKE CARE OF THINGS AT HOME, OR GET ALONG WITH OTHER PEOPLE: SOMEWHAT DIFFICULT
6. FEELING BAD ABOUT YOURSELF - OR THAT YOU ARE A FAILURE OR HAVE LET YOURSELF OR YOUR FAMILY DOWN: NOT AT ALL
4. FEELING TIRED OR HAVING LITTLE ENERGY: SEVERAL DAYS
9. THOUGHTS THAT YOU WOULD BE BETTER OFF DEAD, OR OF HURTING YOURSELF: NOT AT ALL

## 2025-08-25 ENCOUNTER — OFFICE VISIT (OUTPATIENT)
Dept: FAMILY MEDICINE CLINIC | Age: 45
End: 2025-08-25
Payer: COMMERCIAL

## 2025-08-25 VITALS
HEIGHT: 66 IN | DIASTOLIC BLOOD PRESSURE: 88 MMHG | WEIGHT: 185.8 LBS | SYSTOLIC BLOOD PRESSURE: 128 MMHG | HEART RATE: 97 BPM | OXYGEN SATURATION: 99 % | BODY MASS INDEX: 29.86 KG/M2 | TEMPERATURE: 98.2 F

## 2025-08-25 DIAGNOSIS — Z12.11 SCREEN FOR COLON CANCER: ICD-10-CM

## 2025-08-25 DIAGNOSIS — E55.9 VITAMIN D DEFICIENCY: ICD-10-CM

## 2025-08-25 DIAGNOSIS — F32.A ANXIETY AND DEPRESSION: ICD-10-CM

## 2025-08-25 DIAGNOSIS — Z00.00 PREVENTATIVE HEALTH CARE: Primary | ICD-10-CM

## 2025-08-25 DIAGNOSIS — F41.9 ANXIETY AND DEPRESSION: ICD-10-CM

## 2025-08-25 DIAGNOSIS — Z12.31 ENCOUNTER FOR SCREENING MAMMOGRAM FOR MALIGNANT NEOPLASM OF BREAST: ICD-10-CM

## 2025-08-25 DIAGNOSIS — F41.9 ANXIETY: ICD-10-CM

## 2025-08-25 PROCEDURE — 99396 PREV VISIT EST AGE 40-64: CPT | Performed by: NURSE PRACTITIONER

## 2025-08-25 RX ORDER — DOCUSATE SODIUM 100 MG/1
100 CAPSULE, LIQUID FILLED ORAL 2 TIMES DAILY
COMMUNITY
Start: 2025-06-21 | End: 2025-08-25 | Stop reason: SDUPTHER

## 2025-08-25 RX ORDER — AMITRIPTYLINE HYDROCHLORIDE 10 MG/1
10 TABLET ORAL NIGHTLY
Qty: 90 TABLET | Refills: 3 | Status: SHIPPED | OUTPATIENT
Start: 2025-08-25

## 2025-08-25 RX ORDER — OMEPRAZOLE 40 MG/1
40 CAPSULE, DELAYED RELEASE ORAL DAILY
Qty: 90 CAPSULE | Refills: 3 | Status: SHIPPED | OUTPATIENT
Start: 2025-08-25

## 2025-08-25 RX ORDER — BUSPIRONE HYDROCHLORIDE 5 MG/1
TABLET ORAL
Qty: 180 TABLET | Refills: 3 | Status: SHIPPED | OUTPATIENT
Start: 2025-08-25

## 2025-08-25 RX ORDER — DOCUSATE SODIUM 100 MG/1
100 CAPSULE, LIQUID FILLED ORAL 2 TIMES DAILY
Qty: 180 CAPSULE | Refills: 3 | Status: SHIPPED | OUTPATIENT
Start: 2025-08-25

## 2025-08-25 SDOH — ECONOMIC STABILITY: FOOD INSECURITY: WITHIN THE PAST 12 MONTHS, THE FOOD YOU BOUGHT JUST DIDN'T LAST AND YOU DIDN'T HAVE MONEY TO GET MORE.: NEVER TRUE

## 2025-08-25 SDOH — ECONOMIC STABILITY: FOOD INSECURITY: WITHIN THE PAST 12 MONTHS, YOU WORRIED THAT YOUR FOOD WOULD RUN OUT BEFORE YOU GOT MONEY TO BUY MORE.: NEVER TRUE
